# Patient Record
Sex: MALE | Race: WHITE | NOT HISPANIC OR LATINO | Employment: OTHER | URBAN - METROPOLITAN AREA
[De-identification: names, ages, dates, MRNs, and addresses within clinical notes are randomized per-mention and may not be internally consistent; named-entity substitution may affect disease eponyms.]

---

## 2023-06-01 ENCOUNTER — APPOINTMENT (EMERGENCY)
Dept: CT IMAGING | Facility: HOSPITAL | Age: 88
DRG: 375 | End: 2023-06-01
Payer: MEDICARE

## 2023-06-01 ENCOUNTER — APPOINTMENT (INPATIENT)
Dept: VASCULAR ULTRASOUND | Facility: HOSPITAL | Age: 88
DRG: 375 | End: 2023-06-01
Payer: MEDICARE

## 2023-06-01 ENCOUNTER — HOSPITAL ENCOUNTER (INPATIENT)
Facility: HOSPITAL | Age: 88
LOS: 1 days | DRG: 375 | End: 2023-06-02
Attending: EMERGENCY MEDICINE | Admitting: FAMILY MEDICINE
Payer: MEDICARE

## 2023-06-01 DIAGNOSIS — R77.8 ELEVATED TROPONIN: ICD-10-CM

## 2023-06-01 DIAGNOSIS — I48.91 ATRIAL FIBRILLATION, UNSPECIFIED TYPE (HCC): ICD-10-CM

## 2023-06-01 DIAGNOSIS — I25.10 CORONARY ARTERY DISEASE INVOLVING NATIVE HEART, UNSPECIFIED VESSEL OR LESION TYPE, UNSPECIFIED WHETHER ANGINA PRESENT: ICD-10-CM

## 2023-06-01 DIAGNOSIS — C78.7 METASTATIC COLON CANCER TO LIVER (HCC): ICD-10-CM

## 2023-06-01 DIAGNOSIS — R10.9 ABDOMINAL PAIN: Primary | ICD-10-CM

## 2023-06-01 DIAGNOSIS — Z95.810 AICD (AUTOMATIC CARDIOVERTER/DEFIBRILLATOR) PRESENT: ICD-10-CM

## 2023-06-01 DIAGNOSIS — E11.22 TYPE 2 DIABETES MELLITUS WITH DIABETIC CHRONIC KIDNEY DISEASE, UNSPECIFIED CKD STAGE, UNSPECIFIED WHETHER LONG TERM INSULIN USE (HCC): ICD-10-CM

## 2023-06-01 DIAGNOSIS — K56.609 SMALL BOWEL OBSTRUCTION (HCC): ICD-10-CM

## 2023-06-01 DIAGNOSIS — K56.609 SBO (SMALL BOWEL OBSTRUCTION) (HCC): ICD-10-CM

## 2023-06-01 DIAGNOSIS — C18.9 METASTATIC COLON CANCER TO LIVER (HCC): ICD-10-CM

## 2023-06-01 PROBLEM — K92.2 GI BLEED: Status: ACTIVE | Noted: 2023-06-01

## 2023-06-01 LAB
2HR DELTA HS TROPONIN: 14 NG/L
2HR DELTA HS TROPONIN: 6 NG/L
4HR DELTA HS TROPONIN: 10 NG/L
4HR DELTA HS TROPONIN: 6 NG/L
ABO GROUP BLD: NORMAL
ABO GROUP BLD: NORMAL
ALBUMIN SERPL BCP-MCNC: 2.9 G/DL (ref 3.5–5)
ALP SERPL-CCNC: 330 U/L (ref 34–104)
ALT SERPL W P-5'-P-CCNC: 10 U/L (ref 7–52)
ANION GAP SERPL CALCULATED.3IONS-SCNC: 9 MMOL/L (ref 4–13)
ANISOCYTOSIS BLD QL SMEAR: PRESENT
AST SERPL W P-5'-P-CCNC: 16 U/L (ref 13–39)
ATRIAL RATE: 107 BPM
ATRIAL RATE: 108 BPM
BASOPHILS # BLD AUTO: 0.04 THOUSANDS/ÂΜL (ref 0–0.1)
BASOPHILS # BLD MANUAL: 0 THOUSAND/UL (ref 0–0.1)
BASOPHILS NFR BLD AUTO: 0 % (ref 0–1)
BASOPHILS NFR MAR MANUAL: 0 % (ref 0–1)
BILIRUB SERPL-MCNC: 0.62 MG/DL (ref 0.2–1)
BLD GP AB SCN SERPL QL: NEGATIVE
BUN SERPL-MCNC: 20 MG/DL (ref 5–25)
BURR CELLS BLD QL SMEAR: PRESENT
CALCIUM ALBUM COR SERPL-MCNC: 9.7 MG/DL (ref 8.3–10.1)
CALCIUM SERPL-MCNC: 8.8 MG/DL (ref 8.4–10.2)
CARDIAC TROPONIN I PNL SERPL HS: 101 NG/L
CARDIAC TROPONIN I PNL SERPL HS: 60 NG/L
CARDIAC TROPONIN I PNL SERPL HS: 66 NG/L
CARDIAC TROPONIN I PNL SERPL HS: 70 NG/L
CARDIAC TROPONIN I PNL SERPL HS: 87 NG/L
CARDIAC TROPONIN I PNL SERPL HS: 93 NG/L
CHLORIDE SERPL-SCNC: 103 MMOL/L (ref 96–108)
CO2 SERPL-SCNC: 26 MMOL/L (ref 21–32)
CREAT SERPL-MCNC: 1.27 MG/DL (ref 0.6–1.3)
DACRYOCYTES BLD QL SMEAR: PRESENT
EOSINOPHIL # BLD AUTO: 0 THOUSAND/ÂΜL (ref 0–0.61)
EOSINOPHIL # BLD MANUAL: 0 THOUSAND/UL (ref 0–0.4)
EOSINOPHIL NFR BLD AUTO: 0 % (ref 0–6)
EOSINOPHIL NFR BLD MANUAL: 0 % (ref 0–6)
ERYTHROCYTE [DISTWIDTH] IN BLOOD BY AUTOMATED COUNT: 19.4 % (ref 11.6–15.1)
ERYTHROCYTE [DISTWIDTH] IN BLOOD BY AUTOMATED COUNT: 19.4 % (ref 11.6–15.1)
FERRITIN SERPL-MCNC: 140 NG/ML (ref 24–336)
GFR SERPL CREATININE-BSD FRML MDRD: 50 ML/MIN/1.73SQ M
GLUCOSE SERPL-MCNC: 132 MG/DL (ref 65–140)
GLUCOSE SERPL-MCNC: 136 MG/DL (ref 65–140)
HCT VFR BLD AUTO: 26.4 % (ref 36.5–49.3)
HCT VFR BLD AUTO: 31.4 % (ref 36.5–49.3)
HCT VFR BLD AUTO: 31.9 % (ref 36.5–49.3)
HCT VFR BLD AUTO: 34.6 % (ref 36.5–49.3)
HGB BLD-MCNC: 10.1 G/DL (ref 12–17)
HGB BLD-MCNC: 10.9 G/DL (ref 12–17)
HGB BLD-MCNC: 8.3 G/DL (ref 12–17)
HGB BLD-MCNC: 9.8 G/DL (ref 12–17)
IMM GRANULOCYTES # BLD AUTO: 0.11 THOUSAND/UL (ref 0–0.2)
IMM GRANULOCYTES NFR BLD AUTO: 1 % (ref 0–2)
IRON SATN MFR SERPL: 8 % (ref 20–50)
IRON SERPL-MCNC: 23 UG/DL (ref 65–175)
LACTATE SERPL-SCNC: 1.3 MMOL/L (ref 0.5–2)
LYMPHOCYTES # BLD AUTO: 0.92 THOUSANDS/ÂΜL (ref 0.6–4.47)
LYMPHOCYTES # BLD AUTO: 2.19 THOUSAND/UL (ref 0.6–4.47)
LYMPHOCYTES # BLD AUTO: 22 % (ref 14–44)
LYMPHOCYTES NFR BLD AUTO: 8 % (ref 14–44)
MCH RBC QN AUTO: 28.2 PG (ref 26.8–34.3)
MCH RBC QN AUTO: 28.2 PG (ref 26.8–34.3)
MCHC RBC AUTO-ENTMCNC: 31.5 G/DL (ref 31.4–37.4)
MCHC RBC AUTO-ENTMCNC: 31.7 G/DL (ref 31.4–37.4)
MCV RBC AUTO: 89 FL (ref 82–98)
MCV RBC AUTO: 89 FL (ref 82–98)
MONOCYTES # BLD AUTO: 1.39 THOUSAND/UL (ref 0–1.22)
MONOCYTES # BLD AUTO: 1.87 THOUSAND/ÂΜL (ref 0.17–1.22)
MONOCYTES NFR BLD AUTO: 17 % (ref 4–12)
MONOCYTES NFR BLD: 14 % (ref 4–12)
NEUTROPHILS # BLD AUTO: 8.41 THOUSANDS/ÂΜL (ref 1.85–7.62)
NEUTROPHILS # BLD MANUAL: 6.26 THOUSAND/UL (ref 1.85–7.62)
NEUTS BAND NFR BLD MANUAL: 3 % (ref 0–8)
NEUTS SEG NFR BLD AUTO: 60 % (ref 43–75)
NEUTS SEG NFR BLD AUTO: 74 % (ref 43–75)
NRBC BLD AUTO-RTO: 0 /100 WBCS
OVALOCYTES BLD QL SMEAR: PRESENT
P AXIS: 1 DEGREES
P AXIS: 28 DEGREES
PLATELET # BLD AUTO: 334 THOUSANDS/UL (ref 149–390)
PLATELET # BLD AUTO: 355 THOUSANDS/UL (ref 149–390)
PLATELET BLD QL SMEAR: ADEQUATE
PMV BLD AUTO: 9.2 FL (ref 8.9–12.7)
PMV BLD AUTO: 9.4 FL (ref 8.9–12.7)
POLYCHROMASIA BLD QL SMEAR: PRESENT
POTASSIUM SERPL-SCNC: 3.7 MMOL/L (ref 3.5–5.3)
PR INTERVAL: 250 MS
PR INTERVAL: 256 MS
PROT SERPL-MCNC: 6.1 G/DL (ref 6.4–8.4)
QRS AXIS: -10 DEGREES
QRS AXIS: -10 DEGREES
QRSD INTERVAL: 106 MS
QRSD INTERVAL: 108 MS
QT INTERVAL: 296 MS
QT INTERVAL: 300 MS
QTC INTERVAL: 396 MS
QTC INTERVAL: 400 MS
RBC # BLD AUTO: 3.58 MILLION/UL (ref 3.88–5.62)
RBC # BLD AUTO: 3.87 MILLION/UL (ref 3.88–5.62)
RBC MORPH BLD: PRESENT
RH BLD: POSITIVE
RH BLD: POSITIVE
SODIUM SERPL-SCNC: 138 MMOL/L (ref 135–147)
SPECIMEN EXPIRATION DATE: NORMAL
T WAVE AXIS: 169 DEGREES
T WAVE AXIS: 177 DEGREES
TIBC SERPL-MCNC: 290 UG/DL (ref 250–450)
VARIANT LYMPHS # BLD AUTO: 1 %
VENTRICULAR RATE: 107 BPM
VENTRICULAR RATE: 108 BPM
WBC # BLD AUTO: 11.35 THOUSAND/UL (ref 4.31–10.16)
WBC # BLD AUTO: 9.94 THOUSAND/UL (ref 4.31–10.16)

## 2023-06-01 PROCEDURE — 87040 BLOOD CULTURE FOR BACTERIA: CPT | Performed by: EMERGENCY MEDICINE

## 2023-06-01 PROCEDURE — 74177 CT ABD & PELVIS W/CONTRAST: CPT

## 2023-06-01 PROCEDURE — 85025 COMPLETE CBC W/AUTO DIFF WBC: CPT | Performed by: EMERGENCY MEDICINE

## 2023-06-01 PROCEDURE — C9113 INJ PANTOPRAZOLE SODIUM, VIA: HCPCS | Performed by: FAMILY MEDICINE

## 2023-06-01 PROCEDURE — 83550 IRON BINDING TEST: CPT | Performed by: FAMILY MEDICINE

## 2023-06-01 PROCEDURE — 83605 ASSAY OF LACTIC ACID: CPT | Performed by: EMERGENCY MEDICINE

## 2023-06-01 PROCEDURE — 83540 ASSAY OF IRON: CPT | Performed by: FAMILY MEDICINE

## 2023-06-01 PROCEDURE — 93971 EXTREMITY STUDY: CPT | Performed by: SURGERY

## 2023-06-01 PROCEDURE — 84484 ASSAY OF TROPONIN QUANT: CPT | Performed by: FAMILY MEDICINE

## 2023-06-01 PROCEDURE — 80053 COMPREHEN METABOLIC PANEL: CPT | Performed by: EMERGENCY MEDICINE

## 2023-06-01 PROCEDURE — 99284 EMERGENCY DEPT VISIT MOD MDM: CPT

## 2023-06-01 PROCEDURE — 96361 HYDRATE IV INFUSION ADD-ON: CPT

## 2023-06-01 PROCEDURE — 85007 BL SMEAR W/DIFF WBC COUNT: CPT | Performed by: FAMILY MEDICINE

## 2023-06-01 PROCEDURE — 84484 ASSAY OF TROPONIN QUANT: CPT | Performed by: EMERGENCY MEDICINE

## 2023-06-01 PROCEDURE — 99223 1ST HOSP IP/OBS HIGH 75: CPT | Performed by: NURSE PRACTITIONER

## 2023-06-01 PROCEDURE — 96375 TX/PRO/DX INJ NEW DRUG ADDON: CPT

## 2023-06-01 PROCEDURE — 99223 1ST HOSP IP/OBS HIGH 75: CPT | Performed by: PHYSICIAN ASSISTANT

## 2023-06-01 PROCEDURE — 36415 COLL VENOUS BLD VENIPUNCTURE: CPT | Performed by: EMERGENCY MEDICINE

## 2023-06-01 PROCEDURE — 99223 1ST HOSP IP/OBS HIGH 75: CPT | Performed by: INTERNAL MEDICINE

## 2023-06-01 PROCEDURE — 93010 ELECTROCARDIOGRAM REPORT: CPT | Performed by: INTERNAL MEDICINE

## 2023-06-01 PROCEDURE — 86901 BLOOD TYPING SEROLOGIC RH(D): CPT | Performed by: EMERGENCY MEDICINE

## 2023-06-01 PROCEDURE — 85014 HEMATOCRIT: CPT | Performed by: FAMILY MEDICINE

## 2023-06-01 PROCEDURE — 82948 REAGENT STRIP/BLOOD GLUCOSE: CPT

## 2023-06-01 PROCEDURE — 85018 HEMOGLOBIN: CPT | Performed by: FAMILY MEDICINE

## 2023-06-01 PROCEDURE — 85027 COMPLETE CBC AUTOMATED: CPT | Performed by: FAMILY MEDICINE

## 2023-06-01 PROCEDURE — 93005 ELECTROCARDIOGRAM TRACING: CPT

## 2023-06-01 PROCEDURE — 96374 THER/PROPH/DIAG INJ IV PUSH: CPT

## 2023-06-01 PROCEDURE — 99223 1ST HOSP IP/OBS HIGH 75: CPT | Performed by: FAMILY MEDICINE

## 2023-06-01 PROCEDURE — 82728 ASSAY OF FERRITIN: CPT | Performed by: FAMILY MEDICINE

## 2023-06-01 PROCEDURE — 93971 EXTREMITY STUDY: CPT

## 2023-06-01 PROCEDURE — 86850 RBC ANTIBODY SCREEN: CPT | Performed by: EMERGENCY MEDICINE

## 2023-06-01 PROCEDURE — 86900 BLOOD TYPING SEROLOGIC ABO: CPT | Performed by: EMERGENCY MEDICINE

## 2023-06-01 RX ORDER — TRAZODONE HYDROCHLORIDE 50 MG/1
50 TABLET ORAL
COMMUNITY
End: 2023-06-02

## 2023-06-01 RX ORDER — DEXTROSE AND SODIUM CHLORIDE 5; .9 G/100ML; G/100ML
100 INJECTION, SOLUTION INTRAVENOUS CONTINUOUS
Status: DISCONTINUED | OUTPATIENT
Start: 2023-06-01 | End: 2023-06-02 | Stop reason: HOSPADM

## 2023-06-01 RX ORDER — DIGOXIN 125 MCG
125 TABLET ORAL DAILY
COMMUNITY
End: 2023-06-02

## 2023-06-01 RX ORDER — FINASTERIDE 5 MG/1
5 TABLET, FILM COATED ORAL DAILY
COMMUNITY
End: 2023-06-02

## 2023-06-01 RX ORDER — FERROUS SULFATE 325(65) MG
325 TABLET ORAL
COMMUNITY
End: 2023-06-02

## 2023-06-01 RX ORDER — ONDANSETRON 2 MG/ML
4 INJECTION INTRAMUSCULAR; INTRAVENOUS EVERY 6 HOURS PRN
Status: DISCONTINUED | OUTPATIENT
Start: 2023-06-01 | End: 2023-06-02 | Stop reason: HOSPADM

## 2023-06-01 RX ORDER — POLYETHYLENE GLYCOL 3350 17 G/17G
17 POWDER, FOR SOLUTION ORAL DAILY
COMMUNITY
End: 2023-06-02

## 2023-06-01 RX ORDER — ROSUVASTATIN CALCIUM 20 MG/1
20 TABLET, COATED ORAL DAILY
COMMUNITY
End: 2023-06-02

## 2023-06-01 RX ORDER — CLOPIDOGREL BISULFATE 75 MG/1
75 TABLET ORAL DAILY
COMMUNITY
End: 2023-06-02

## 2023-06-01 RX ORDER — PANTOPRAZOLE SODIUM 40 MG/10ML
40 INJECTION, POWDER, LYOPHILIZED, FOR SOLUTION INTRAVENOUS EVERY 12 HOURS SCHEDULED
Status: DISCONTINUED | OUTPATIENT
Start: 2023-06-01 | End: 2023-06-02 | Stop reason: HOSPADM

## 2023-06-01 RX ORDER — ONDANSETRON 2 MG/ML
4 INJECTION INTRAMUSCULAR; INTRAVENOUS ONCE
Status: COMPLETED | OUTPATIENT
Start: 2023-06-01 | End: 2023-06-01

## 2023-06-01 RX ORDER — METOCLOPRAMIDE HYDROCHLORIDE 5 MG/ML
10 INJECTION INTRAMUSCULAR; INTRAVENOUS ONCE
Status: COMPLETED | OUTPATIENT
Start: 2023-06-01 | End: 2023-06-01

## 2023-06-01 RX ORDER — ASPIRIN 81 MG/1
81 TABLET, CHEWABLE ORAL DAILY
COMMUNITY
End: 2023-06-02

## 2023-06-01 RX ORDER — RANOLAZINE 500 MG/1
500 TABLET, EXTENDED RELEASE ORAL 2 TIMES DAILY
COMMUNITY
End: 2023-06-02

## 2023-06-01 RX ORDER — MULTIVIT-MIN/IRON FUM/FOLIC AC 7.5 MG-4
1 TABLET ORAL DAILY
COMMUNITY
End: 2023-06-02

## 2023-06-01 RX ADMIN — SODIUM CHLORIDE 250 ML: 0.9 INJECTION, SOLUTION INTRAVENOUS at 17:53

## 2023-06-01 RX ADMIN — DEXTROSE AND SODIUM CHLORIDE 100 ML/HR: 5; .9 INJECTION, SOLUTION INTRAVENOUS at 17:53

## 2023-06-01 RX ADMIN — PANTOPRAZOLE SODIUM 40 MG: 40 INJECTION, POWDER, FOR SOLUTION INTRAVENOUS at 20:27

## 2023-06-01 RX ADMIN — DEXTROSE AND SODIUM CHLORIDE 50 ML/HR: 5; .9 INJECTION, SOLUTION INTRAVENOUS at 07:35

## 2023-06-01 RX ADMIN — SODIUM CHLORIDE 1000 ML: 0.9 INJECTION, SOLUTION INTRAVENOUS at 09:30

## 2023-06-01 RX ADMIN — SODIUM CHLORIDE 1000 ML: 0.9 INJECTION, SOLUTION INTRAVENOUS at 14:13

## 2023-06-01 RX ADMIN — IOHEXOL 100 ML: 350 INJECTION, SOLUTION INTRAVENOUS at 05:45

## 2023-06-01 RX ADMIN — METOCLOPRAMIDE HYDROCHLORIDE 10 MG: 5 INJECTION INTRAMUSCULAR; INTRAVENOUS at 05:28

## 2023-06-01 RX ADMIN — PANTOPRAZOLE SODIUM 40 MG: 40 INJECTION, POWDER, FOR SOLUTION INTRAVENOUS at 09:30

## 2023-06-01 RX ADMIN — ONDANSETRON 4 MG: 2 INJECTION INTRAMUSCULAR; INTRAVENOUS at 04:37

## 2023-06-01 RX ADMIN — SODIUM CHLORIDE 1000 ML: 0.9 INJECTION, SOLUTION INTRAVENOUS at 04:36

## 2023-06-01 NOTE — ASSESSMENT & PLAN NOTE
+ From nursing facility with symptoms of hematemesis  Does report intermittent abdominal pain  Last bowel movement 3 days ago  Denies any melanotic stools/bright red blood mixed with stools  · Hemoglobin 10 9  · Patient hypotensive with blood pressure readings originally in systolic 15A and heart rate 120s improving with IV fluids  · Monitor H&H every 8 hours, transfuse if hemoglobin less than 7  · Receiving IV fluid bolus  On maintenance fluids  · On IV Protonix  · N p o   · Likely in the setting of proximal colon mass

## 2023-06-01 NOTE — ASSESSMENT & PLAN NOTE
· CT abdomen pelvis noting proximal colon mass causing small bowel obstruction  Also noted extensive bilateral hepatic metastasis  · Currently n p o /IV fluids/as needed morphine/Zofran  · General surgery consulted  · Palliative care consulted to address goals of care given metastatic colon cancer

## 2023-06-01 NOTE — QUICK NOTE
"Lengthy discussion with Mr Tor Mansfield at bedside  He is awake and alert at the time of conversation, although hard of hearing  He tells me that he knows he has cancer and that it has spread throughout his body  He confirms that he declined chemo because, although he knew it may buy him some time, he asks \"at what cost?\"  He tells me he had decided years ago that he would never do chemotherapy  He also is able to explain that he understands he has a mass obstructing his colon and that he would be at very high risk for surgery  He acknowledges that his cardiologist in [place] had already told him that his heart would make it high risk for him to survive any type of surgery  Mr Tor Mansfield is further able to tell me he understands that his issues are not able to be fixed and that he knows he is at the end of his life  He does tell me he'd like to live and that he thought he would have more time, but that his body is telling him his time is up  He states his ultimate goal would be to get to [place] to spend the time he has left with his daughter and family  He stated his clear intention that he wished to \"die with dignity\"  We discussed option of hospice and option to use medications/therapies to avoid pain and suffering, to which he was agreeable  Discussed with Dr Ankur Cardenas and Dr Damian Headings     "

## 2023-06-01 NOTE — ASSESSMENT & PLAN NOTE
· Known history of colon cancer [attempting to get records from patient's prior oncology team]  · Patient clearly states he does not want any chemotherapy  · Undecided regarding any surgery  · Palliative care consulted to address goals of care  · Oncology consulted

## 2023-06-01 NOTE — PLAN OF CARE
Problem: CARDIOVASCULAR - ADULT  Goal: Maintains optimal cardiac output and hemodynamic stability  Description: INTERVENTIONS:  - Monitor I/O, vital signs and rhythm  - Monitor for S/S and trends of decreased cardiac output  - Administer and titrate ordered vasoactive medications to optimize hemodynamic stability  - Assess quality of pulses, skin color and temperature  - Assess for signs of decreased coronary artery perfusion  - Instruct patient to report change in severity of symptoms  Outcome: Progressing  Goal: Absence of cardiac dysrhythmias or at baseline rhythm  Description: INTERVENTIONS:  - Continuous cardiac monitoring, vital signs, obtain 12 lead EKG if ordered  - Administer antiarrhythmic and heart rate control medications as ordered  - Monitor electrolytes and administer replacement therapy as ordered  Outcome: Progressing     Problem: GASTROINTESTINAL - ADULT  Goal: Minimal or absence of nausea and/or vomiting  Description: INTERVENTIONS:  - Administer IV fluids if ordered to ensure adequate hydration  - Maintain NPO status until nausea and vomiting are resolved  - Nasogastric tube if ordered  - Administer ordered antiemetic medications as needed  - Provide nonpharmacologic comfort measures as appropriate  - Advance diet as tolerated, if ordered  - Consider nutrition services referral to assist patient with adequate nutrition and appropriate food choices  Outcome: Progressing  Goal: Maintains or returns to baseline bowel function  Description: INTERVENTIONS:  - Assess bowel function  - Encourage oral fluids to ensure adequate hydration  - Administer IV fluids if ordered to ensure adequate hydration  - Administer ordered medications as needed  - Encourage mobilization and activity  - Consider nutritional services referral to assist patient with adequate nutrition and appropriate food choices  Outcome: Progressing  Goal: Maintains adequate nutritional intake  Description: INTERVENTIONS:  - Monitor percentage of each meal consumed  - Identify factors contributing to decreased intake, treat as appropriate  - Assist with meals as needed  - Monitor I&O, weight, and lab values if indicated  - Obtain nutrition services referral as needed  Outcome: Progressing  Goal: Establish and maintain optimal ostomy function  Description: INTERVENTIONS:  - Assess bowel function  - Encourage oral fluids to ensure adequate hydration  - Administer IV fluids if ordered to ensure adequate hydration   - Administer ordered medications as needed  - Encourage mobilization and activity  - Nutrition services referral to assist patient with appropriate food choices  - Assess stoma site  - Consider wound care consult   Outcome: Progressing  Goal: Oral mucous membranes remain intact  Description: INTERVENTIONS  - Assess oral mucosa and hygiene practices  - Implement preventative oral hygiene regimen  - Implement oral medicated treatments as ordered  - Initiate Nutrition services referral as needed  Outcome: Progressing

## 2023-06-01 NOTE — CONSULTS
Consultation - General Surgery  Vishal Piña 82 y o  male MRN: [id number]  Unit/Bed#: ED 12 Encounter: [id number]                                                  Inpatient consult to Acute Care Surgery  Consult performed by: Wilber Doyle PA-C  Consult ordered by: Chanelle Zacarias PA-C          Assessment/Plan   18G M with Small Bowel Obstruction 2/2 proximal colon mass  Metastatic Colon CA with metastasis to the liver  Mild B/L pleural effusions  Diabetes Mellitus  HTN  CAD on Plavix/ASA 81, s/p CABG  - unclear when last dose  - most recent ECHO 5/22/23 with LVEF 55-60%  Atrial Fibrillation with AICD in place  - on digoxin  - reports Watchman device  HLD  BPH  H/o C  Diff infection  Anemia  Indwelling bhakta catheter for BPH with urinary retention    Presented with abdominal pain, nausea and vomiting  No BM/Flatus for 3 days  Clinical and imaging findings consistent with SBO 2/2 colon mass with incompetent cecal valve  Plan  -- Patient is not an optimal surgical candidate with metastatic disease and poor nutritional status along with multiple comorbidities and advanced age  Since Mr Oksana Vazquez is interested in the potential to pursue surgical options, it would depend on if he is deemed an appropriate risk by Cardiology  The proposed surgery would be a diverting loop ileostomy, attempted laparoscopically  It was explained to patient that he is high risk for surgery as well as that this is a palliative surgery and is not curative as we would not be removing any tumor and he has metastatic disease  -- NG tube for decompression  Patient initially refused, but now agreeable  -- Diet NPO  -- IV fluids  -- Cardiology consultation for risk stratification  -- Venous US of LUE to rule out DVT  -- Monitor labs and abdominal exams  -- DVT ppx recommended  -- Monitor I/Os  -- Patient and plan discussed with Surgical attending   Further decision making per attending   ______________________________________________________________________    CHIEF COMPLAINT:  Abdominal pain, coffee ground emesis, diarrhea, known colon CA    HPI: Keli Belle is a 82y o  year old male with PMHx of atrial fibrillation, CAD s/p CABG, Diabetes Mellitus, HTNand known colon mass who presented from [facility] by EMS for R sided abdominal pain, and coffee ground emesis  He reports vomiting 3 times a dark color  He did not feel well yesterday and has decreased appetite and nausea  He has had less of an appetite over the past 1-2 weeks  He was recommended nutritional supplements but has not been able to tolerated them  He has lost weight which he states was initially intentional to help with his diabetes but more recently has has not had much of an appetite  He has also felt increasingly weak  He has not passed flatus or had a BM in 3 days  Before that he had intermittent loose stool and was being treated for C  Diff  He does admit to occasional dark black stool  He is aware of his colon mass and liver mets and states he was diagnosed in [place] a little over a month ago  He had been having intermittent obstructions and was anemic  Colonoscopy confirmed a colon CA  He does not want chemotherapy or other treatment but is open to potential surgical intervention depending on quality of life  He does note a recent fall for which he was on the floor for almost 2 days  He was hospitalized at [facility] and found to have a rib fx 2 weeks ago  He was discharged to rehab and readmitted with atrial flutter with RVR  He also has an inpatient stay in [facility] at the beginning of May and required 2U PRBC for anemia  He has surgical history of Lap argelia and CABG  Review of Systems   Constitutional: Positive for activity change, appetite change, fatigue and unexpected weight change  Negative for chills and fever  HENT: Negative for congestion, postnasal drip and sore throat  Eyes: Negative  Respiratory: Negative  Negative for cough, shortness of breath and wheezing  Cardiovascular: Negative for chest pain and palpitations  Gastrointestinal: Positive for abdominal distention, abdominal pain, constipation, nausea and vomiting  Negative for diarrhea  Endocrine: Negative  Genitourinary: Leroy   Musculoskeletal: Negative  Skin: Positive for pallor  Allergic/Immunologic: Negative  Neurological: Positive for weakness  Hematological: Negative  Psychiatric/Behavioral: Negative  All other systems reviewed and are negative  Meds/Allergies   Allergies   Allergen Reactions   • Ambien [Zolpidem] Delirium   • Aspirin Other (See Comments)     Unknown    • Diphenhydramine Hyperactivity   • Metformin Other (See Comments)     unknown      all current active meds have been reviewed       Historical Information   Past Medical History:   Diagnosis Date   • Anemia    • Atrial fibrillation (HCC)    • BPH (benign prostatic hyperplasia)    • C  difficile diarrhea    • Cystitis    • [facility]    • Obstructive uropathy      History reviewed  No pertinent surgical history  Social History   Social History     Substance and Sexual Activity   Alcohol Use Never     Social History     Substance and Sexual Activity   Drug Use Never     Social History     Tobacco Use   Smoking Status Unknown   Smokeless Tobacco Not on file       Family History: History reviewed  No pertinent family history        Objective   Lab Results:   Lab Results   Component Value Date    HCT 31 4 (L) 06/01/2023    HGB 9 8 (L) 06/01/2023     06/01/2023    WBC 9 94 06/01/2023     Lab Results   Component Value Date    BUN 20 06/01/2023     06/01/2023    CO2 26 06/01/2023    CREATININE 1 27 06/01/2023    K 3 7 06/01/2023     Lab Results   Component Value Date    CALCIUM 8 8 06/01/2023     Lab Results   Component Value Date    ALB 2 9 (L) 06/01/2023    ALKPHOS 330 (H) 06/01/2023    ALT 10 "06/01/2023    AST 16 06/01/2023    TBILI 0 62 06/01/2023     No results found for: \"APTT\", \"INR\"  No results found for: \"BNP\", \"TROPONINI\", \"TROPONINT\"    No intake or output data in the 24 hours ending 06/01/23 0839  Invasive Devices     Peripheral Intravenous Line  Duration           Peripheral IV 06/01/23 Left;Ventral (anterior) Hand <1 day    Peripheral IV 06/01/23 Right Antecubital <1 day                Physical Exam  Vitals: BP 93/54 (BP Location: Right arm)   Pulse (!) 113   Temp 98 7 °F (37 1 °C) (Oral)   Resp (!) 26   Wt 75 5 kg (166 lb 7 2 oz)   SpO2 95%   GEN: A & O x 3, cooperative, NAD, appears stated age  HEENT: PERRLA EOMI, sclera anicterus, oral mucosa pink, membranes dry  NECK: supple   LUNGS: clear throughout, decreased at base  COR: tachycardic rate  ABD: +BS, abdomen distended and tympanic with epigastric tenderness to palpation, non peritoneal, non rigid  EXTREM: FROM no joint deformities  Edema none BLE, +LUE edema wo tenderness   SKIN: warm, dry, no rash, there is pallor  NEURO: CN II -XII intact, no tremor, affect appropriate    Imaging Studies:   CT abdomen pelvis with contrast  Result Date: 6/1/2023  Impression: 1  Proximal colonic mass causing a small bowel obstruction  2   Extensive bilateral hepatic metastases  3   Small bilateral pleural effusions with bibasilar passive atelectasis   I personally discussed this study with [facility] on 6/1/2023 6:15 AM  Workstation performed: [id number]         [address] Mate[address] Marte PA-C  6/1/2023  "

## 2023-06-01 NOTE — SOCIAL WORK
"  Palliative LSW saw patient at the bedside today  LSW appreciates the opportunity to provide patient/family with inpatient emotional support and guidance while patient continues to receive medical attention from the medical team     Topics discussed: Patient reported that he met with various teams today and surgery doesn't seem to be an option for him  Patient reported that he does understand that without the NGT, he runs the risk of his \"intestines exploding and then I'm gone\"  Patient reported that he can't go back to the rehab due to it being too risky  Patient reported that he would like to go to [place] to be with his daughter  Patient reported that with his cancer dx, his prognosis is less than 1 yr  [facility] team discussed risks of complications with the SBO and how that could alter prognosis  Discussion regarding optimizing as best as can be done if his goal is to go to [place] and to attempt to reduce risk during travel  Patient aware that he could return to rehab on hospice however stated he can't afford the $14,000 a month bill  Areas that need follow-up: ongoing support   Resources given: none  Others present:  [facility]    I have spent 40 minutes with Patient  today in which greater than 50% of this time was spent in counseling/coordination of care regarding Patient and family education, Counseling / Coordination of care, Documenting in the medical record, Obtaining or reviewing history   and Communicating with other healthcare professionals          LSW will continue to follow as requested by the medical team, patient, or family    "

## 2023-06-01 NOTE — H&P
[facility]  H&P  Name: Ivory Garcia 82 y o  male I MRN: [id number]  Unit/Bed#: ED 15 I Date of Admission: 6/1/2023   Date of Service: 6/1/2023 I Hospital Day: 0      Assessment/Plan   GI bleed  Assessment & Plan  + From nursing facility with symptoms of hematemesis  Does report intermittent abdominal pain  Last bowel movement 3 days ago  Denies any melanotic stools/bright red blood mixed with stools  · Hemoglobin 10 9  · Patient hypotensive with blood pressure readings originally in systolic 96E and heart rate 120s improving with IV fluids  · Monitor H&H every 8 hours, transfuse if hemoglobin less than 7  · Receiving IV fluid bolus  On maintenance fluids  · On IV Protonix  · N p o   · Likely in the setting of proximal colon mass  SBO (small bowel obstruction) (HCC)  Assessment & Plan  · CT abdomen pelvis noting proximal colon mass causing small bowel obstruction  Also noted extensive bilateral hepatic metastasis  · Currently n p o /IV fluids/as needed morphine/Zofran  · General surgery consulted  · Palliative care consulted to address goals of care given metastatic colon cancer  Elevated troponin  Assessment & Plan  · Troponin trend 60/66, continue to trend until peak  · 2D echo ordered  · Telemetry ordered  · Patient currently chest pain-free  · Holding off on aspirin or heparin given patient having hematemesis  Metastatic colon cancer to [facility])  Assessment & Plan  · Known history of colon cancer [attempting to get records from patient's prior oncology team]  · Patient clearly states he does not want any chemotherapy  · Undecided regarding any surgery  · Palliative care consulted to address goals of care  · Oncology consulted  VTE Prophylaxis: Pharmacologic VTE Prophylaxis contraindicated due to GI bleed  / sequential compression device   Code Status: level 3  POLST: POLST form is not discussed and not completed at this time    Discussion with family: adam daughter Edin Bearden    Anticipated Length of Stay:  Patient will be admitted on an Inpatient basis with an anticipated length of stay of  over 2 midnights  Justification for Hospital Stay: SBO    Total Time for Visit, including Counseling / Coordination of Care: 45 minutes  Greater than 50% of this total time spent on direct patient counseling and coordination of care  Chief Complaint:   Hematemesis    History of Present Illness:    Mildred Golden is a 82 y o  male with known underlying history of colon cancer/hepatic meta stasis who presents to the ED from SNF facility due to symptoms of hematemesis over the last 3 days  He does report intermittent abdominal pain  Last bowel movement 3 days ago  Currently not passing flatus  Denies any black tarry stools/bright red blood mixed with stools  On speaking with patient, he adamantly refuses any chemotherapy but states would like to meet with surgical team in regards to surgical options  On speaking with his daughter she does report patient is DNR/DNI and has been leaning more towards hospice/palliative options in the past     Review of Systems:    Review of Systems   Constitutional: Positive for activity change, appetite change and fatigue  Negative for chills, diaphoresis and fever  HENT: Negative for congestion, postnasal drip and rhinorrhea  Respiratory: Negative for cough, shortness of breath and wheezing  Cardiovascular: Negative for chest pain, palpitations and leg swelling  Gastrointestinal: Positive for abdominal pain, constipation, nausea and vomiting  Negative for blood in stool and diarrhea  Genitourinary: Negative for dysuria, flank pain, frequency and hematuria  Musculoskeletal: Negative for gait problem and myalgias  Skin: Negative for rash  Neurological: Positive for weakness  Negative for dizziness, seizures, speech difficulty, light-headedness and headaches         Past Medical and Surgical History:     Past Medical History: Diagnosis Date   • Anemia    • Atrial fibrillation (HCC)    • BPH (benign prostatic hyperplasia)    • C  difficile diarrhea    • Cystitis    • Malignant ne[facility] )    • Obstructive uropathy        History reviewed  No pertinent surgical history  Meds/Allergies:    Prior to Admission medications    Not on File     I have reviewed home medications using allscripts  Allergies: Allergies   Allergen Reactions   • Ambien [Zolpidem] Delirium   • Aspirin Other (See Comments)     Unknown    • Diphenhydramine Hyperactivity   • Metformin Other (See Comments)     unknown       Social History:     Marital Status:      Substance Use History:   Social History     Substance and Sexual Activity   Alcohol Use Never     Social History     Tobacco Use   Smoking Status Unknown   Smokeless Tobacco Not on file     Social History     Substance and Sexual Activity   Drug Use Never       Family History:    non-contributory    Physical Exam:     Vitals:   Blood Pressure: 100/52 (06/01/23 1015)  Pulse: (!) 112 (06/01/23 1015)  Temperature: 98 7 °F (37 1 °C) (06/01/23 0416)  Temp Source: Oral (06/01/23 0416)  Respirations: (!) 26 (06/01/23 1015)  Weight - Scale: 75 5 kg (166 lb 7 2 oz) (06/01/23 0409)  SpO2: 94 % (06/01/23 1015)    Physical Exam  Vitals reviewed  Constitutional:       General: He is not in acute distress  Appearance: He is ill-appearing  HENT:      Head: Normocephalic and atraumatic  Nose: Nose normal  No congestion  Mouth/Throat:      Mouth: Mucous membranes are dry  Pharynx: Oropharynx is clear  Eyes:      Conjunctiva/sclera: Conjunctivae normal       Pupils: Pupils are equal, round, and reactive to light  Cardiovascular:      Rate and Rhythm: Regular rhythm  Tachycardia present  Pulses: Normal pulses  Pulmonary:      Effort: Pulmonary effort is normal  No respiratory distress  Breath sounds: Normal breath sounds  No wheezing or rales     Abdominal: General: There is distension  Palpations: Abdomen is soft  Tenderness: There is abdominal tenderness  There is no guarding  Musculoskeletal:         General: No swelling  Normal range of motion  Cervical back: Normal range of motion and neck supple  Skin:     General: Skin is warm and dry  Findings: No rash  Neurological:      General: No focal deficit present  Mental Status: He is alert and oriented to person, place, and time  Psychiatric:         Mood and Affect: Mood normal        Additional Data:     Lab Results: I have personally reviewed pertinent reports  Results from last 7 days   Lab Units 06/01/23  0927 06/01/23  0432   BANDS PCT % 3  --    EOS PCT % 0 0   HEMATOCRIT % 31 9* 34 6*   HEMOGLOBIN g/dL 10 1* 10 9*   LYMPHS PCT %  --  8*   LYMPHO PCT % 22  --    MONOS PCT %  --  17*   MONO PCT % 14*  --    NEUTROS PCT %  --  74   PLATELETS Thousands/uL 334 355   WBC Thousand/uL 9 94 11 35*     Results from last 7 days   Lab Units 06/01/23  0432   ANION GAP mmol/L 9   ALBUMIN g/dL 2 9*   ALK PHOS U/L 330*   ALT U/L 10   AST U/L 16   BUN mg/dL 20   CALCIUM mg/dL 8 8   CHLORIDE mmol/L 103   CO2 mmol/L 26   CREATININE mg/dL 1 27   GLUCOSE RANDOM mg/dL 132   POTASSIUM mmol/L 3 7   SODIUM mmol/L 138   TOTAL BILIRUBIN mg/dL 0 62                 Results from last 7 days   Lab Units 06/01/23  0432   LACTIC ACID mmol/L 1 3       Imaging: I have personally reviewed pertinent reports  CT abdomen pelvis with contrast   Final Result by Joshua Camacho MD (06/01 5873)      1  Proximal colonic mass causing a small bowel obstruction  2   Extensive bilateral hepatic metastases  3   Small bilateral pleural effusions with bibasilar passive atelectasis              I personally discussed this study with Bailey Camacho on 6/1/2023 6:15 AM             Workstation performed: [id number]         VAS upper limb venous duplex scan, unilateral/limited    (Results Pending) Allscripts / [facility] Records Reviewed: Yes     ** Please Note: This note has been constructed using a voice recognition system   **

## 2023-06-01 NOTE — CONSULTS
Consultation - Palliative and Supportive Care   Jodie Loser 82 y o  male [id number]    Assessment:  Goals of care counseling  SBO  Colon cancer with extensive liver mets    Plan:  1  Symptom management per SLIM  2  Goals of care- patient states he is now interested in NG tube and work up for surgery if he is eligible for surgery  2    Goals:  Level 3 code status     3  Social support:  · Supportive listening provided  · Normalized experience of patient/family  · Provided anxiety containment  · Provided anticipatory guidance      4  Follow up    • Palliative Care will continue to follow for symptom management and goals of care discussions will be ongoing  Please reach out via [employer] if questions or concerns arise  I have reviewed the patient's controlled substance dispensing history in the Prescription Drug Monitoring Program in compliance with the [facility] regulations before prescribing any controlled substances  Last refills: NA    Decisional apparatus:  Patient is competent on exam today  If competency is lost, patient's substitute decision maker would default to his daughter Dayanara Cavazos by [place] 169  Advance Directive/Living Will: No  POLST: No  POA Forms: No    We appreciate the invitation to be involved in this patient's care  We will continue to follow throughout this hospitalization  Please do not hesitate to reach our on call provider through our clinic answering service at  should you have acute symptom control concerns  MARGARET Banks, ROBBY  Palliative and Supportive Care  Clinic/Answering Service: [phone]  You can find me on [employer]!      IDENTIFICATION:  Inpatient consult to Palliative Care  Consult performed by: ROBBY Pinon  Consult ordered by: Tramaine Hess MD        Physician Requesting Consult: Tramaine Hess MD  Reason for Consult / Principal Problem: GOC counseling and SM secondary to colon cancer with liver mets      History of Present Illness:  Bernadette Whitmore is a 82 y o  male who presents with a palliative diagnosis of history of colon cancer with GI blockage, [place] [facility] [place] secondary tocoffee ground emesis and diarrhea, started 1:00 AM   Recently diagnosed with c-diff, and being treated with Flagyl  He states he was seen by surgery, and after more consideration, he is interested in NG tube and work up to see if he is eligible for surgery  He states he would eventually like to be with his daughter in [place], if he is not eligible for surgery, he does not want to go back to [facility]  Review of Systems   Constitutional: Negative for fatigue  Respiratory: Negative for shortness of breath  Cardiovascular: Negative for chest pain  Gastrointestinal: Negative for nausea and vomiting  Intermittent mild abdominal discomfort   Musculoskeletal: Negative  Past Medical History:   Diagnosis Date   • Anemia    • Atrial fibrillation (HCC)    • BPH (benign prostatic hyperplasia)    • C  difficile diarrhea    • Cystitis    • Malignant neoplasm of colon ([address] )    • Obstructive uropathy      History reviewed  No pertinent surgical history    Social History     Socioeconomic History   • Marital status:      Spouse name: Not on file   • Number of children: Not on file   • Years of education: Not on file   • Highest education level: Not on file   Occupational History   • Not on file   Tobacco Use   • Smoking status: Unknown   • Smokeless tobacco: Not on file   Substance and Sexual Activity   • Alcohol use: Never   • Drug use: Never   • Sexual activity: Never   Other Topics Concern   • Not on file   Social History Narrative   • Not on file     Social Determinants of Health     Financial Resource Strain: Not on file   Food Insecurity: Not on file   Transportation Needs: Not on file   Physical Activity: Not on file   Stress: Not on file   Social Connections: Not on file Intimate Partner Violence: Not on file   Housing Stability: Not on file     History reviewed  No pertinent family history  Medications:  current meds:   Current Facility-Administered Medications   Medication Dose Route Frequency   • dextrose 5 % and sodium chloride 0 9 % infusion  100 mL/hr Intravenous Continuous   • morphine injection 2 mg  2 mg Intravenous Q6H PRN   • ondansetron (ZOFRAN) injection 4 mg  4 mg Intravenous Q6H PRN   • pantoprazole (PROTONIX) injection 40 mg  40 mg Intravenous Q12H [facility]   • sodium chloride 0 9 % bolus 1,000 mL  1,000 mL Intravenous Once       Allergies   Allergen Reactions   • Ambien [Zolpidem] Delirium   • Aspirin Other (See Comments)     Unknown    • Diphenhydramine Hyperactivity   • Metformin Other (See Comments)     unknown       Objective:  BP (!) 87/57 (BP Location: Right arm)   Pulse (!) 122   Temp 98 7 °F (37 1 °C) (Oral)   Resp (!) 30   Wt 75 5 kg (166 lb 7 2 oz)   SpO2 91%     Physical Exam  Constitutional:       General: He is not in acute distress  Cardiovascular:      Rate and Rhythm: Tachycardia present  Pulmonary:      Effort: Pulmonary effort is normal    Skin:     General: Skin is warm and dry  Neurological:      Mental Status: He is alert and oriented to person, place, and time     Psychiatric:         Mood and Affect: Mood normal            Lab Results:   CBC:   Lab Results   Component Value Date    HCT 34 6 (L) 06/01/2023    HGB 10 9 (L) 06/01/2023    MCH 28 2 06/01/2023    MCHC 31 5 06/01/2023    MCV 89 06/01/2023    MPV 9 4 06/01/2023    NRBC 0 06/01/2023     06/01/2023    RBC 3 87 (L) 06/01/2023    RDW 19 4 (H) 06/01/2023    WBC 11 35 (H) 06/01/2023   , CMP:   Lab Results   Component Value Date    ALKPHOS 330 (H) 06/01/2023    ALT 10 06/01/2023    AST 16 06/01/2023    BUN 20 06/01/2023    CALCIUM 8 8 06/01/2023     06/01/2023    CO2 26 06/01/2023    CREATININE 1 27 06/01/2023    EGFR 50 06/01/2023    K 3 7 06/01/2023    SODIUM 138 06/01/2023     Imaging Studies: I have personally reviewed pertinent reports  GALLBLADDER: Gallbladder is surgically absent      SPLEEN:  Unremarkable      PANCREAS:  Unremarkable      ADRENAL GLANDS:  Unremarkable      KIDNEYS/URETERS: One or more simple renal cyst(s) is noted  Otherwise unremarkable kidneys  No hydronephrosis      STOMACH AND BOWEL AND ABDOMINOPELVIC CAVITY:  : Focal wall thickening of the proximal ascending colon measuring up to 3 6 cm likely reflects known tumor (series 2, image 96)  The bowel proximal to this is dilated to 6 3 cm (series 601, image 72), and the   adjacent small bowel is fluid-filled and dilated up to 3 1 cm (series 2, image 136)  In the left abdomen, the fluid-filled small bowel is dilated up to 3 5 cm (series 601, image 68)  There is free fluid in the right lower quadrant adjacent to the   dilated loops of small bowel, as well as small volume ascites in the upper quadrants  No pneumoperitoneum or lymphadenopathy  Large hiatal hernia        VESSELS: Atherosclerotic changes are present  No evidence of aneurysm      PELVIS     REPRODUCTIVE ORGANS:  Unremarkable for patient's age      URINARY BLADDER: Leroy catheter in situ      ABDOMINAL WALL/INGUINAL REGIONS: Anasarca      OSSEOUS STRUCTURES: No acute fracture or destructive osseous lesion  Spinal degenerative changes are noted      IMPRESSION:     1  Proximal colonic mass causing a small bowel obstruction  2   Extensive bilateral hepatic metastases  3   Small bilateral pleural effusions with bibasilar passive atelectasis           I personally discussed this study with Noah Dorantes on 6/1/2023 6:15 AM            Workstation performed: [id number]         Counseling / Coordination of Care  Total floor / unit time spent today 40 minutes  Greater than 50% of total time was spent with the patient and / or family counseling and / or coordination of care   A description of the counseling / coordination of care: "Reviewed chart, provided medical updates, determined goals of care, discussed palliative care and symptom management, discussed comfort care, hospice care, discussed code status, provided anticipatory guidance, determined competency and POA/HCA, determined social/family support, provided psychosocial support  Reviewed with BALA KNIGHT, and Surgery      Portions of this document may have been created using dictation software and as such some \"sound alike\" terms may have been generated by the system  Do not hesitate to contact me with any questions or clarifications      "

## 2023-06-01 NOTE — ASSESSMENT & PLAN NOTE
· Troponin trend 60/66, continue to trend until peak  · 2D echo ordered  · Telemetry ordered  · Patient currently chest pain-free  · Holding off on aspirin or heparin given patient having hematemesis

## 2023-06-01 NOTE — ED PROVIDER NOTES
History  Chief Complaint   Patient presents with   • Abdominal Pain     Pt BIBA from [facility] with c/o coffee ground emesis and diarrhea which started at 1am  Recently diagnosised with c-diff and being treated with flagyl  Hx GI blockages and colon CA  61-year-old male brought in by ambulance from [place] [id number]  Recent diagnosis with C  difficile, being treated with oral Flagyl  Patient complaining of coffee-ground emesis and diarrhea  He has abdominal distention and right-sided abdominal pain  Patient has a past medical history of colon cancer and a history of GI blockages  Concern for the same  None       Past Medical History:   Diagnosis Date   • Anemia    • Atrial fibrillation (HCC)    • BPH (benign prostatic hyperplasia)    • C  difficile diarrhea    • Cystitis    • Malignant neoplasm of colon ([facility] [address] )    • Obstructive uropathy        History reviewed  No pertinent surgical history  History reviewed  No pertinent family history  I have reviewed and agree with the history as documented  E-Cigarette/Vaping     E-Cigarette/Vaping Substances     Social History     Tobacco Use   • Smoking status: Unknown   Substance Use Topics   • Alcohol use: Never   • Drug use: Never       Review of Systems   Constitutional: Negative for chills and fever  Respiratory: Negative for chest tightness and shortness of breath  Cardiovascular: Negative for chest pain and leg swelling  Gastrointestinal: Positive for abdominal pain, blood in stool, diarrhea, nausea and vomiting  Genitourinary: Negative for dysuria and flank pain  Musculoskeletal: Negative for back pain and neck pain  Skin: Negative for wound  Neurological: Positive for light-headedness  Negative for dizziness and headaches  Physical Exam  Physical Exam  Vitals reviewed  Constitutional:       General: He is not in acute distress  Appearance: He is well-developed  He is ill-appearing  He is not diaphoretic     HENT:      Head: Normocephalic and atraumatic  Eyes:      General: No scleral icterus  Right eye: No discharge  Left eye: No discharge  Conjunctiva/sclera: Conjunctivae normal       Pupils: Pupils are equal, round, and reactive to light  Neck:      Vascular: No JVD  Cardiovascular:      Rate and Rhythm: Normal rate and regular rhythm  Heart sounds: Normal heart sounds  No murmur heard  No friction rub  No gallop  Pulmonary:      Effort: Pulmonary effort is normal  No respiratory distress  Breath sounds: Normal breath sounds  No wheezing or rales  Chest:      Chest wall: No tenderness  Abdominal:      General: Bowel sounds are increased  There is distension  Palpations: Abdomen is rigid  Tenderness: There is abdominal tenderness in the right upper quadrant and right lower quadrant  There is guarding  There is no rebound  Hernia: No hernia is present  Musculoskeletal:         General: No tenderness or deformity  Normal range of motion  Cervical back: Normal range of motion and neck supple  Skin:     General: Skin is warm and dry  Coloration: Skin is not pale  Findings: No erythema or rash  Neurological:      Mental Status: He is alert and oriented to person, place, and time  Cranial Nerves: No cranial nerve deficit     Psychiatric:         Behavior: Behavior normal          Vital Signs  ED Triage Vitals   Temperature Pulse Respirations Blood Pressure SpO2   06/01/23 0416 06/01/23 0409 06/01/23 0409 06/01/23 0409 06/01/23 0409   98 7 °F (37 1 °C) (!) 110 18 95/50 99 %      Temp Source Heart Rate Source Patient Position - Orthostatic VS BP Location FiO2 (%)   06/01/23 0416 06/01/23 0409 06/01/23 0409 06/01/23 0409 --   Oral Monitor Sitting Right arm       Pain Score       06/01/23 0409       7           Vitals:    06/01/23 0515 06/01/23 0545 06/01/23 0600 06/01/23 0630   BP: 99/51 109/55 107/55 101/51   Pulse: 100 (!) 108 (!) 108 (!) 113   Patient Position - Orthostatic VS: Sitting Sitting Sitting Sitting         Visual Acuity      ED Medications  Medications   dextrose 5 % and sodium chloride 0 9 % infusion (has no administration in time range)   morphine injection 2 mg (has no administration in time range)   ondansetron (ZOFRAN) injection 4 mg (has no administration in time range)   sodium chloride 0 9 % bolus 1,000 mL (1,000 mL Intravenous New Bag 6/1/23 0436)   ondansetron (ZOFRAN) injection 4 mg (4 mg Intravenous Given 6/1/23 0437)   metoclopramide (REGLAN) injection 10 mg (10 mg Intravenous Given 6/1/23 0528)   iohexol (OMNIPAQUE) 350 MG/ML injection (SINGLE-DOSE) 100 mL (100 mL Intravenous Given 6/1/23 0545)       Diagnostic Studies  Results Reviewed     Procedure Component Value Units Date/Time    HS Troponin I 2hr [[id number]]  (Abnormal) Collected: 06/01/23 0630    Lab Status: Final result Specimen: Blood from Arm, Right Updated: 06/01/23 0707     hs TnI 2hr 66 ng/L      Delta 2hr hsTnI 6 ng/L     HS Troponin I 4hr [[id number]]     Lab Status: No result Specimen: Blood     Lactic acid, plasma (w/reflex if result > 2 0) [[id number]]  (Normal) Collected: 06/01/23 0432    Lab Status: Final result Specimen: Blood from Arm, Right Updated: 06/01/23 0520     LACTIC ACID 1 3 mmol/L     Narrative:      Result may be elevated if tourniquet was used during collection      HS Troponin 0hr (reflex protocol) [[id number]]  (Abnormal) Collected: 06/01/23 0432    Lab Status: Final result Specimen: Blood from Arm, Right Updated: 06/01/23 0511     hs TnI 0hr 60 ng/L     Comprehensive metabolic panel [[id number]]  (Abnormal) Collected: 06/01/23 0432    Lab Status: Final result Specimen: Blood from Arm, Right Updated: 06/01/23 0502     Sodium 138 mmol/L      Potassium 3 7 mmol/L      Chloride 103 mmol/L      CO2 26 mmol/L      ANION GAP 9 mmol/L      BUN 20 mg/dL      Creatinine 1 27 mg/dL      Glucose 132 mg/dL      Calcium 8 8 mg/dL      Corrected Calcium 9 7 mg/dL      AST 16 U/L ALT 10 U/L      Alkaline Phosphatase 330 U/L      Total Protein 6 1 g/dL      Albumin 2 9 g/dL      Total Bilirubin 0 62 mg/dL      eGFR 50 ml/min/1 73sq m     Narrative:      [place] guidelines for Chronic Kidney Disease (CKD):   •  Stage 1 with normal or high GFR (GFR > 90 mL/min/1 73 square meters)  •  Stage 2 Mild CKD (GFR = 60-89 mL/min/1 73 square meters)  •  Stage 3A Moderate CKD (GFR = 45-59 mL/min/1 73 square meters)  •  Stage 3B Moderate CKD (GFR = 30-44 mL/min/1 73 square meters)  •  Stage 4 Severe CKD (GFR = 15-29 mL/min/1 73 square meters)  •  Stage 5 End Stage CKD (GFR <15 mL/min/1 73 square meters)  Note: GFR calculation is accurate only with a steady state creatinine    CBC and differential [[id number]]  (Abnormal) Collected: 06/01/23 0432    Lab Status: Final result Specimen: Blood from Arm, Right Updated: 06/01/23 0454     WBC 11 35 Thousand/uL      RBC 3 87 Million/uL      Hemoglobin 10 9 g/dL      Hematocrit 34 6 %      MCV 89 fL      MCH 28 2 pg      MCHC 31 5 g/dL      RDW 19 4 %      MPV 9 4 fL      Platelets 298 Thousands/uL      nRBC 0 /100 WBCs      Neutrophils Relative 74 %      Immat GRANS % 1 %      Lymphocytes Relative 8 %      Monocytes Relative 17 %      Eosinophils Relative 0 %      Basophils Relative 0 %      Neutrophils Absolute 8 41 Thousands/µL      Immature Grans Absolute 0 11 Thousand/uL      Lymphocytes Absolute 0 92 Thousands/µL      Monocytes Absolute 1 87 Thousand/µL      Eosinophils Absolute 0 00 Thousand/µL      Basophils Absolute 0 04 Thousands/µL     Narrative: This is an appended report  These results have been appended to a previously verified report  Blood culture [[id number]] Collected: 06/01/23 0431    Lab Status: In process Specimen: Blood from Arm, Right Updated: 06/01/23 0437    Blood culture [[id number]] Collected: 06/01/23 0431    Lab Status:  In process Specimen: Blood from Hand, Left Updated: 06/01/23 0437                 CT abdomen pelvis with contrast   Final Result by Suly Grullon MD (06/01 6989)      1  Proximal colonic mass causing a small bowel obstruction  2   Extensive bilateral hepatic metastases  3   Small bilateral pleural effusions with bibasilar passive atelectasis  I personally discussed this study with Jeronimo Enamorado on 6/1/2023 6:15 AM             Workstation performed: [id number]                    Procedures  Procedures         ED Course  ED Course as of 06/01/23 0729   Thu Jun 01, 2023   0518 hs TnI 0hr(!): 60   0518 At this point, holding off on heparin or aspirin given history of GI bleeding  Medical Decision Making  82 y o  M w abdominal pain  Concern for small bowel obstruction, diverticulitis, colon wall abscess, pancreatitis, hepatitis, other intra-abdominal pathology  Abdominal labs, CAT scan, pain / vomiting control  patient will require admission  Amount and/or Complexity of Data Reviewed  Labs: ordered  Decision-making details documented in ED Course  Radiology: ordered  Risk  Prescription drug management  Disposition  Final diagnoses:   Abdominal pain   Small bowel obstruction ([address] )     Time reflects when diagnosis was documented in both MDM as applicable and the Disposition within this note     Time User Action Codes Description Comment    6/1/2023  6:21 AM Feroz Mclaughlin Add [R10 9] Abdominal pain     6/1/2023  6:21 AM Chel Mclaughlin Add [K56 609] Small bowel obstruction [facility])       ED Disposition     ED Disposition   Admit    Condition   Stable    Date/Time   Thu Jun 1, 2023  7:22 AM    Comment   Discussed karime Bah - inpatient tele  Follow-up Information    None         Patient's Medications    No medications on file       No discharge procedures on file      PDMP Review     None          ED Provider  Electronically Signed by           Valentino Tinajero,   06/01/23 7126

## 2023-06-01 NOTE — CONSULTS
Consultation - Cardiology   Aubrie Trujillo 82 y o  male MRN: [id number]  Unit/Bed#: -01 Encounter: [id number]  06/01/23  3:55 PM    Assessment/ Plan:  1  Preoperative cardiac risk assessment in patient with small bowel obstruction secondary to proximal colon mass and patient with metastatic colon cancer with metastasis to the liver; surgical intervention would be diverting loop ileostomy  Patient is considered high risk from a cardiac standpoint to proceed given multiple medical problems advanced age  Discussed the risk and benefits with the patient  We will try to continue with medical management if possible  An echocardiogram done at [facility] EF 55 to 60%, basal inferior akinesis, moderate AR, history of MitraClip, mild to moderate MR with centrally directed jet, mild IN  No further cardiac testing advised at this time  2   Nonischemic myocardial injury, peak troponin 87; secondary to hypotension, anemia, tachycardia  Recent echocardiogram   No further cardiac testing planned at this time  3   History of CAD, CABG; currently asymptomatic  Recent echocardiogram as above  Continue all medications  Monitor telemetry closely  4   Paroxysmal atrial fibrillation, currently in sinus rhythm  Patient is tachycardic  But appears asymptomatic at this time  Continue to monitor closely  Continue with IV fluids  5   Hypotension, blood pressure currently 73/34, currently getting IV fluids  Critical care at the bedside to discuss possibility for pressor support  6   Metastatic colon cancer with liver metastasis  Management per hematology/oncology  7   Anemia, H&H 9 8/31 4  Continue to monitor closely  Patient is quite pale  Patient is stable from a cardiac standpoint  We will sign off  Call if needed        History of Present Illness   Physician Requesting Consult: Aida Pappas MD    Reason for Consult / Principal Problem: preop risk assessment    HPI: Aubrie Trujillo is a 82 y o  year old male who presents with right-sided abdominal pain, coffee-ground emesis  Patient vomited 3 times  Patient did not feel well yesterday and had decreased appetite and nausea  She has had less of an appetite for the last couple of weeks  He was recommended nutritional supplements but did not tolerate them  Patient has been feeling more weak  Patient denied any bowel movements in the last 3 days  Patient was recently treated for C  difficile  Patient states he was diagnosed with colon cancer/colon mass and liver metastasis in [place] about 1 month ago  Patient has had intermittent obstructions as well as anemia since then  Patient declined chemotherapy or other treatment but was potentially open to surgical intervention if needed  Patient was recently hospitalized after a fall with rib fractures, at that time sent home on hospice  Patient also had recent hospitalization at [facility] that required 2 units of blood transfusion  Currently patient has NG tube in place  Denies any chest pain, chest pressure, chest heaviness  Denies any shortness of breath  PMH: CAD, CABG, atrial fibrillation, diabetes, hypertension, colon cancer with liver metastasis    Consults    EKG: Sinus tachycardia, first-degree AV block, ST-T wave normalities consider lateral ischemia      Review of Systems   Constitutional: Positive for appetite change and unexpected weight change  Respiratory: Negative  Cardiovascular: Negative  Gastrointestinal: Positive for abdominal pain, constipation, nausea and vomiting         + Coffee-ground emesis   Neurological: Positive for weakness  Hematological: Negative  Psychiatric/Behavioral: Negative  All other systems reviewed and are negative        Historical Information   Past Medical History:   Diagnosis Date   • Anemia    • Atrial fibrillation (HCC)    • BPH (benign prostatic hyperplasia)    • C  difficile diarrhea    • Cystitis    • Malignant neoplasm of colon ([facility] [address] )    • Obstructive uropathy      History reviewed  No pertinent surgical history  Social History     Substance and Sexual Activity   Alcohol Use Never     Social History     Substance and Sexual Activity   Drug Use Never     Social History     Tobacco Use   Smoking Status Unknown   Smokeless Tobacco Not on file       Family History: History reviewed  No pertinent family history  Meds/Allergies   all current active meds have been reviewed and current meds:   Current Facility-Administered Medications   Medication Dose Route Frequency   • dextrose 5 % and sodium chloride 0 9 % infusion  100 mL/hr Intravenous Continuous   • morphine injection 2 mg  2 mg Intravenous Q6H PRN   • ondansetron (ZOFRAN) injection 4 mg  4 mg Intravenous Q6H PRN   • pantoprazole (PROTONIX) injection 40 mg  40 mg Intravenous Q12H [address]   • sodium chloride 0 9 % bolus 1,000 mL  1,000 mL Intravenous Once     Allergies   Allergen Reactions   • Ambien [Zolpidem] Delirium   • Aspirin Other (See Comments)     Unknown    • Diphenhydramine Hyperactivity   • Metformin Other (See Comments)     unknown       Objective   Vitals: Blood pressure (!) 73/34, pulse (!) 115, temperature 98 7 °F (37 1 °C), temperature source Oral, resp  rate 22, weight 75 5 kg (166 lb 7 2 oz), SpO2 95 %  , There is no height or weight on file to calculate BMI ,   Orthostatic Blood Pressures    Flowsheet Row Most Recent Value   Blood Pressure 73/34 filed at 06/01/2023 1515   Patient Position - Orthostatic VS Lying filed at 06/01/2023 5231          Systolic (92NSC), GIV:38 , Min:73 , BZT:482     Diastolic (43LVF), HEB:35, Min:34, Max:57        Intake/Output Summary (Last 24 hours) at 6/1/2023 1555  Last data filed at 6/1/2023 1154  Gross per 24 hour   Intake 1000 ml   Output --   Net 1000 ml       Invasive Devices     Peripheral Intravenous Line  Duration           Peripheral IV 06/01/23 Left;Ventral (anterior) Hand <1 day    Peripheral IV 06/01/23 Right Antecubital <1 day          Drain  Duration           NG/OG/Enteral Tube Nasogastric 16 Fr Right nare <1 day                    Physical Exam:  GEN: Alert and oriented in no acute distress  Ill appearing, +pallor  HEENT: Sclera anicteric, conjunctivae pink  Oropharynx clear  NECK: + NGT, Supple, no carotid bruits, no significant JVD  Trachea midline, no thyromegaly  HEART: Regular tachycardic, normal S1 and S2, no murmurs, clicks, gallops or rubs  PMI nondisplaced, no thrills  LUNGS:Decreased breath sounds bilaterally; no wheezes, rales, or rhonchi  No increased work of breathing or signs of respiratory distress  ABDOMEN: Soft, nontender, nondistended, normoactive bowel sounds  EXTREMITIES: Skin warm and well perfused, no clubbing, cyanosis, or edema  NEURO: No focal findings  Normal speech  Mood and affect normal    SKIN: Normal without suspicious lesions on exposed skin        Lab Results:     Troponins:   Results from last 7 days   Lab Units 06/01/23  1340 06/01/23  0928 06/01/23  0630 06/01/23  0432   HS TNI 0HR ng/L 87*  --   --  60*   HS TNI 2HR ng/L  --   --  66*  --    HS TNI 4HR ng/L  --  70*  --   --    HSTNI D4 ng/L  --  10  --   --        CBC with diff:   Results from last 7 days   Lab Units 06/01/23  1340 06/01/23  0927 06/01/23  0432   HEMATOCRIT % 31 4* 31 9* 34 6*   HEMOGLOBIN g/dL 9 8* 10 1* 10 9*   MCH pg  --  28 2 28 2   MCHC g/dL  --  31 7 31 5   MCV fL  --  89 89   MPV fL  --  9 2 9 4   NRBC AUTO /100 WBCs  --   --  0   PLATELETS Thousands/uL  --  334 355   RBC Million/uL  --  3 58* 3 87*   RDW %  --  19 4* 19 4*   WBC Thousand/uL  --  9 94 11 35*         CMP:   Results from last 7 days   Lab Units 06/01/23  0432   ALK PHOS U/L 330*   ALT U/L 10   AST U/L 16   BUN mg/dL 20   CALCIUM mg/dL 8 8   CHLORIDE mmol/L 103   CO2 mmol/L 26   CREATININE mg/dL 1 27   EGFR ml/min/1 73sq m 50   POTASSIUM mmol/L 3 7

## 2023-06-01 NOTE — ED NOTES
"Pt refusing NG tube at this time  Pt states, \"That tube is not an option  \" Educated pt on importance but continues to refuse  Pt requesting to wait to speak to surgery prior to deciding  Er Provider notified        Krysten Valdovinos RN  06/01/23 8468    "

## 2023-06-02 VITALS
WEIGHT: 166.45 LBS | HEART RATE: 105 BPM | TEMPERATURE: 98.9 F | BODY MASS INDEX: 24.65 KG/M2 | DIASTOLIC BLOOD PRESSURE: 49 MMHG | RESPIRATION RATE: 18 BRPM | HEIGHT: 69 IN | OXYGEN SATURATION: 98 % | SYSTOLIC BLOOD PRESSURE: 99 MMHG

## 2023-06-02 PROBLEM — Z71.89 GOALS OF CARE, COUNSELING/DISCUSSION: Status: ACTIVE | Noted: 2023-06-02

## 2023-06-02 LAB
ALBUMIN SERPL BCP-MCNC: 2.4 G/DL (ref 3.5–5)
ALP SERPL-CCNC: 288 U/L (ref 34–104)
ALT SERPL W P-5'-P-CCNC: 7 U/L (ref 7–52)
ANION GAP SERPL CALCULATED.3IONS-SCNC: 6 MMOL/L (ref 4–13)
ANISOCYTOSIS BLD QL SMEAR: PRESENT
AST SERPL W P-5'-P-CCNC: 17 U/L (ref 13–39)
BASOPHILS # BLD MANUAL: 0 THOUSAND/UL (ref 0–0.1)
BASOPHILS NFR MAR MANUAL: 0 % (ref 0–1)
BILIRUB SERPL-MCNC: 0.46 MG/DL (ref 0.2–1)
BUN SERPL-MCNC: 30 MG/DL (ref 5–25)
CALCIUM ALBUM COR SERPL-MCNC: 9.1 MG/DL (ref 8.3–10.1)
CALCIUM SERPL-MCNC: 7.8 MG/DL (ref 8.4–10.2)
CHLORIDE SERPL-SCNC: 112 MMOL/L (ref 96–108)
CO2 SERPL-SCNC: 23 MMOL/L (ref 21–32)
CREAT SERPL-MCNC: 1.45 MG/DL (ref 0.6–1.3)
EOSINOPHIL # BLD MANUAL: 0 THOUSAND/UL (ref 0–0.4)
EOSINOPHIL NFR BLD MANUAL: 0 % (ref 0–6)
ERYTHROCYTE [DISTWIDTH] IN BLOOD BY AUTOMATED COUNT: 20 % (ref 11.6–15.1)
GFR SERPL CREATININE-BSD FRML MDRD: 43 ML/MIN/1.73SQ M
GLUCOSE SERPL-MCNC: 144 MG/DL (ref 65–140)
HCT VFR BLD AUTO: 26.6 % (ref 36.5–49.3)
HGB BLD-MCNC: 8.3 G/DL (ref 12–17)
LYMPHOCYTES # BLD AUTO: 0.7 THOUSAND/UL (ref 0.6–4.47)
LYMPHOCYTES # BLD AUTO: 7 % (ref 14–44)
MCH RBC QN AUTO: 28.2 PG (ref 26.8–34.3)
MCHC RBC AUTO-ENTMCNC: 31.2 G/DL (ref 31.4–37.4)
MCV RBC AUTO: 91 FL (ref 82–98)
METAMYELOCYTES NFR BLD MANUAL: 3 % (ref 0–1)
MONOCYTES # BLD AUTO: 0.3 THOUSAND/UL (ref 0–1.22)
MONOCYTES NFR BLD: 3 % (ref 4–12)
MYELOCYTES NFR BLD MANUAL: 2 % (ref 0–1)
NEUTROPHILS # BLD MANUAL: 8.53 THOUSAND/UL (ref 1.85–7.62)
NEUTS BAND NFR BLD MANUAL: 17 % (ref 0–8)
NEUTS SEG NFR BLD AUTO: 68 % (ref 43–75)
PLATELET # BLD AUTO: 236 THOUSANDS/UL (ref 149–390)
PLATELET BLD QL SMEAR: ADEQUATE
PMV BLD AUTO: 9.3 FL (ref 8.9–12.7)
POTASSIUM SERPL-SCNC: 3.3 MMOL/L (ref 3.5–5.3)
PROT SERPL-MCNC: 4.9 G/DL (ref 6.4–8.4)
RBC # BLD AUTO: 2.94 MILLION/UL (ref 3.88–5.62)
SODIUM SERPL-SCNC: 141 MMOL/L (ref 135–147)
WBC # BLD AUTO: 10.03 THOUSAND/UL (ref 4.31–10.16)

## 2023-06-02 PROCEDURE — NC001 PR NO CHARGE: Performed by: FAMILY MEDICINE

## 2023-06-02 PROCEDURE — 80053 COMPREHEN METABOLIC PANEL: CPT | Performed by: FAMILY MEDICINE

## 2023-06-02 PROCEDURE — C9113 INJ PANTOPRAZOLE SODIUM, VIA: HCPCS | Performed by: FAMILY MEDICINE

## 2023-06-02 PROCEDURE — 99498 ADVNCD CARE PLAN ADDL 30 MIN: CPT | Performed by: NURSE PRACTITIONER

## 2023-06-02 PROCEDURE — 99233 SBSQ HOSP IP/OBS HIGH 50: CPT | Performed by: NURSE PRACTITIONER

## 2023-06-02 PROCEDURE — 85027 COMPLETE CBC AUTOMATED: CPT | Performed by: FAMILY MEDICINE

## 2023-06-02 PROCEDURE — 99239 HOSP IP/OBS DSCHRG MGMT >30: CPT | Performed by: FAMILY MEDICINE

## 2023-06-02 PROCEDURE — 99497 ADVNCD CARE PLAN 30 MIN: CPT | Performed by: NURSE PRACTITIONER

## 2023-06-02 PROCEDURE — 85007 BL SMEAR W/DIFF WBC COUNT: CPT | Performed by: FAMILY MEDICINE

## 2023-06-02 RX ORDER — POTASSIUM CHLORIDE 14.9 MG/ML
20 INJECTION INTRAVENOUS ONCE
Status: COMPLETED | OUTPATIENT
Start: 2023-06-02 | End: 2023-06-02

## 2023-06-02 RX ADMIN — DEXTROSE AND SODIUM CHLORIDE 100 ML/HR: 5; .9 INJECTION, SOLUTION INTRAVENOUS at 03:28

## 2023-06-02 RX ADMIN — POTASSIUM CHLORIDE 20 MEQ: 14.9 INJECTION, SOLUTION INTRAVENOUS at 10:12

## 2023-06-02 RX ADMIN — PANTOPRAZOLE SODIUM 40 MG: 40 INJECTION, POWDER, FOR SOLUTION INTRAVENOUS at 10:13

## 2023-06-02 RX ADMIN — DEXTROSE AND SODIUM CHLORIDE 100 ML/HR: 5; .9 INJECTION, SOLUTION INTRAVENOUS at 14:02

## 2023-06-02 NOTE — SOCIAL WORK
Palliative LSW saw patient at the bedside today  LSW appreciates the opportunity to provider patient/family with inpatient emotional support and guidance while patient continues to receive medical attention from the medical team     Topics discussed:   Met with patient in his room, friend present  Patient requesting when he was going to have his NG tube removed  Discussion had regarding risks of removing the tube, patient verbalized understanding that it could lead to perforation of bowel and death  Patient aware that should he have NG removed, he isn't medically stable to travel to [place]  Discussion had regarding Hospice IPU, patient is open to evaluation to see if he could be eligible  He stated he would like to focus on 1 thing at a time, first thing was to check eligibility for [facility], Care Management made aware  Call to patient's daughter, provided update and education on IPU  Daughter in agreement that patient shouldn't travel to [place] and agreeable to consultation for [facility]  Daughter is going to attempt to arrange for travel back to see patient  Areas that need follow-up: Care Management and SLIM made aware  Resources given: none  Others present:  Patient's friend, Trav Cotton and Resident Dr Thea Philippe    I have spent 60 minutes with Patient and family today in which greater than 50% of this time was spent in counseling/coordination of care regarding Patient and family education, Risk factor reductions, Impressions, Counseling / Coordination of care, Documenting in the medical record, Obtaining or reviewing history   and Communicating with other healthcare professionals          LSW will continue to follow as requested by the medical team, patient, or family

## 2023-06-02 NOTE — PROGRESS NOTES
[address] [place] [address]  Progress Note  Name: Janay Dean I  MRN: [id number]  Unit/Bed#: -01 I Date of Admission: 6/1/2023   Date of Service: 6/2/2023 I Hospital Day: 1    Assessment/Plan   * Goals of care, counseling/discussion  Assessment & Plan  · Ongoing goals of care discussion with patient and daughter  · Patient inclined towards hospice  · Palliative care on board  Inpatient consult placed to hospice liaison  · Wants to meet with hospice liaison before deciding on comfort measures  GI bleed  Assessment & Plan  + From nursing facility with symptoms of hematemesis  Does report intermittent abdominal pain  Last bowel movement 3 days ago  Denies any melanotic stools/bright red blood mixed with stools  · Hemoglobin 10 9 on admission ->8  3 this am  · Patient hypotensive with blood pressure readings originally in systolic 44F and heart rate 120s improving with IV fluids  · Monitor H&H every 8 hours, transfuse if hemoglobin less than 7  · On maintenance fluids  · On IV Protonix  · N p o given SBO  · Likely in the setting of proximal colon mass  · Considering comfort measures vs hospice     SBO (small bowel obstruction) ([facility] [address] )  Assessment & Plan  · CT abdomen pelvis noting proximal colon mass causing small bowel obstruction  Also noted extensive bilateral hepatic metastasis  · Currently n p o /IV fluids/as needed morphine/Zofran  · General surgery on board  They did speak with patient regarding possible treatment of her diverting loop ileostomy however considered high risk per cardiology/surgery  Surgery recommended against any type of surgical intervention & recommending palliative care/hospice care  Elevated troponin  Assessment & Plan  · Troponin peaked at 101  · Holding off on aspirin or heparin given patient having hematemesis    · Seen by cardiology, considered high risk for any intervention  · Signed off    Metastatic colon cancer to liver [facility])  Assessment & Plan  · Known history of colon cancer [attempting to get records from patient's prior oncology team]  · Patient clearly states he does not want any chemotherapy & understands high risk for any surgery  · Palliative care consulted to address goals of care  VTE Pharmacologic Prophylaxis:   Pharmacologic: held due to gi bleed  Mechanical VTE Prophylaxis in Place: Yes    Patient Centered Rounds: I have performed bedside rounds with nursing staff today  Discussions with Specialists or Other Care Team Provider: palliatice, surgery    Education and Discussions with Family / Patient: patient, daughter    Time Spent for Care: 30 minutes  More than 50% of total time spent on counseling and coordination of care as described above  Current Length of Stay: 1 day(s)    Current Patient Status: Inpatient   Certification Statement: The patient will continue to require additional inpatient hospital stay due to needs dispo plan    Discharge Plan: undetermined    Code Status: Level 3 - DNAR and DNI      Subjective:   Patient clearly states understanding that any form of surgery/intervention would be high risk  He also does not want any further chemotherapy  Inclined towards hospice  Requesting to meet with the hospice liaison  Objective:     Vitals:   Temp (24hrs), Av 5 °F (36 9 °C), Min:97 6 °F (36 4 °C), Max:99 2 °F (37 3 °C)    Temp:  [97 6 °F (36 4 °C)-99 2 °F (37 3 °C)] 98 4 °F (36 9 °C)  HR:  [] 107  Resp:  [13-26] 18  BP: (73-99)/(34-70) 99/50  SpO2:  [92 %-97 %] 97 %  Body mass index is 24 58 kg/m²  Input and Output Summary (last 24 hours): Intake/Output Summary (Last 24 hours) at 2023 1230  Last data filed at 2023 1026  Gross per 24 hour   Intake 250 ml   Output 700 ml   Net -450 ml       Physical Exam:     Physical Exam  Constitutional:       General: He is not in acute distress  Appearance: He is ill-appearing     HENT:      Mouth/Throat:      Mouth: Mucous membranes are moist       Pharynx: Oropharynx is clear  Cardiovascular:      Rate and Rhythm: Normal rate and regular rhythm  Pulses: Normal pulses  Pulmonary:      Effort: Pulmonary effort is normal  No respiratory distress  Breath sounds: Normal breath sounds  Abdominal:      General: Bowel sounds are normal  There is distension  Palpations: Abdomen is soft  Tenderness: There is abdominal tenderness  Musculoskeletal:      Right lower leg: No edema  Left lower leg: No edema  Neurological:      Mental Status: He is alert and oriented to person, place, and time  Additional Data:     Labs:    Results from last 7 days   Lab Units 06/02/23  0520 06/01/23  0927 06/01/23  0432   BANDS PCT % 17*   < >  --    EOS PCT % 0   < > 0   HEMATOCRIT % 26 6*   < > 34 6*   HEMOGLOBIN g/dL 8 3*   < > 10 9*   LYMPHS PCT %  --   --  8*   LYMPHO PCT % 7*   < >  --    MONOS PCT %  --   --  17*   MONO PCT % 3*   < >  --    NEUTROS PCT %  --   --  74   PLATELETS Thousands/uL 236   < > 355   WBC Thousand/uL 10 03   < > 11 35*    < > = values in this interval not displayed  Results from last 7 days   Lab Units 06/02/23  0520   ANION GAP mmol/L 6   ALBUMIN g/dL 2 4*   ALK PHOS U/L 288*   ALT U/L 7   AST U/L 17   BUN mg/dL 30*   CALCIUM mg/dL 7 8*   CHLORIDE mmol/L 112*   CO2 mmol/L 23   CREATININE mg/dL 1 45*   GLUCOSE RANDOM mg/dL 144*   POTASSIUM mmol/L 3 3*   SODIUM mmol/L 141   TOTAL BILIRUBIN mg/dL 0 46         Results from last 7 days   Lab Units 06/01/23  2006   POC GLUCOSE mg/dl 136         Results from last 7 days   Lab Units 06/01/23  0432   LACTIC ACID mmol/L 1 3           * I Have Reviewed All Lab Data Listed Above  * Additional Pertinent Lab Tests Reviewed: All Labs Within Last 24 Hours Reviewed    Recent Cultures (last 7 days):     Results from last 7 days   Lab Units 06/01/23  0431   BLOOD CULTURE  No Growth at 24 hrs  No Growth at 24 hrs         Last 24 Hours Medication List:   Current Facility-Administered Medications   Medication Dose Route Frequency Provider Last Rate   • dextrose 5 % and sodium chloride 0 9 %  100 mL/hr Intravenous Continuous Giselle Huang  mL/hr (06/02/23 0328)   • morphine injection  2 mg Intravenous Q6H PRN Giselle Huang MD     • ondansetron  4 mg Intravenous Q6H PRN Giselle Huang MD     • pantoprazole  40 mg Intravenous Q12H Dorothea Lanier MD          Today, Patient Was Seen By: [facility]      ** Please Note: Dictation voice to text software may have been used in the creation of this document   **

## 2023-06-02 NOTE — ASSESSMENT & PLAN NOTE
· Ongoing goals of care discussion with patient and daughter  · Patient inclined towards hospice  · Palliative care on board  Inpatient consult placed to hospice liaison  · Wants to meet with hospice liaison before deciding on comfort measures

## 2023-06-02 NOTE — ASSESSMENT & PLAN NOTE
· Troponin peaked at 101  · Holding off on aspirin or heparin given patient having hematemesis    · Seen by cardiology, considered high risk for any intervention  · Signed off

## 2023-06-02 NOTE — PLAN OF CARE
Problem: MOBILITY - ADULT  Goal: Maintain or return to baseline ADL function  Description: INTERVENTIONS:  -  Assess patient's ability to carry out ADLs; assess patient's baseline for ADL function and identify physical deficits which impact ability to perform ADLs (bathing, care of mouth/teeth, toileting, grooming, dressing, etc )  - Assess/evaluate cause of self-care deficits   - Assess range of motion  - Assess patient's mobility; develop plan if impaired  - Assess patient's need for assistive devices and provide as appropriate  - Encourage maximum independence but intervene and supervise when necessary  - Involve family in performance of ADLs  - Assess for home care needs following discharge   - Consider OT consult to assist with ADL evaluation and planning for discharge  - Provide patient education as appropriate  Outcome: Progressing  Goal: Maintains/Returns to pre admission functional level  Description: INTERVENTIONS:  - Perform BMAT or MOVE assessment daily    - Set and communicate daily mobility goal to care team and patient/family/caregiver     - Collaborate with rehabilitation services on mobility goals if consulted  - Out of bed for toileting  - Record patient progress and toleration of activity level   Outcome: Progressing       Problem: Prexisting or High Potential for Compromised Skin Integrity  Goal: Skin integrity is maintained or improved  Description: INTERVENTIONS:  - Identify patients at risk for skin breakdown  - Assess and monitor skin integrity  - Assess and monitor nutrition and hydration status  - Monitor labs   - Assess for incontinence   - Turn and reposition patient  - Assist with mobility/ambulation  - Relieve pressure over bony prominences  - Avoid friction and shearing  - Provide appropriate hygiene as needed including keeping skin clean and dry  - Evaluate need for skin moisturizer/barrier cream  - Collaborate with interdisciplinary team   - Patient/family teaching  - Consider wound care consult   Outcome: Progressing     Problem: MUSCULOSKELETAL - ADULT  Goal: Maintain or return mobility to safest level of function  Description: INTERVENTIONS:  - Assess patient's ability to carry out ADLs; assess patient's baseline for ADL function and identify physical deficits which impact ability to perform ADLs (bathing, care of mouth/teeth, toileting, grooming, dressing, etc )  - Assess/evaluate cause of self-care deficits   - Assess range of motion  - Assess patient's mobility  - Assess patient's need for assistive devices and provide as appropriate  - Encourage maximum independence but intervene and supervise when necessary  - Involve family in performance of ADLs  - Assess for home care needs following discharge   - Consider OT consult to assist with ADL evaluation and planning for discharge  - Provide patient education as appropriate  Outcome: Progressing  Goal: Maintain proper alignment of affected body part  Description: INTERVENTIONS:  - Support, maintain and protect limb and body alignment  - Provide patient/ family with appropriate education  Outcome: Progressing

## 2023-06-02 NOTE — PROGRESS NOTES
" Spiritual Care Progress Note    2023  Patient: Mildred Golden : 1935  Admission Date & Time: 2023 040  MRN: [id number] CSN: [id number]     visited patient during unit rounds   introduced self & role, discussed end-of-life priorities, explored spiritual needs including beliefs regarding afterlife and death, facilitated storytelling, and provided empathetic presence  Patient emphasized his priority for dignity and agency at end-of-life and voiced request to have NG tube removed  Aware of his poor prognosis, patient stated, \"I'm ready to die and I hope God will call me  \" Patient hopes to spend time with family in [place] while he is alive, and hopes to reunite with  loves ones upon his death  Patient thanked  for providing space to process emotions  Spiritual care remains available to support               Chaplaincy Interventions Utilized:   Empowerment: Encouraged assertiveness, Normalized experience of patient/family, Provided anticipatory guidance, and Provided chaplaincy education    Exploration: Explored emotional needs & resources, Explored relational needs & resources, Explored spiritual needs & resources, and Facilitated story telling    Relationship Building: Cultivated a relationship of care and support and Listened empathically      Chaplaincy Outcomes Achieved:  Catharsis, Debriefed/defused experience, Distress reduced, Expressed gratitude, Identified priorities, and Tearfully processed emotions      Spiritual Coping Strategies Utilized:   Spiritual comfort and Surrender     23 1100   Clinical Encounter Type   Visited With Patient   Routine Visit Introduction   Referral From Other (Comment)  (Rounds (palliative))         "

## 2023-06-02 NOTE — ASSESSMENT & PLAN NOTE
· CT abdomen pelvis noting proximal colon mass causing small bowel obstruction  Also noted extensive bilateral hepatic metastasis  · Currently n p o /IV fluids/as needed morphine/Zofran  · General surgery on board  They did speak with patient regarding possible treatment of her diverting loop ileostomy however considered high risk per cardiology/surgery  Surgery recommended against any type of surgical intervention & recommending palliative care/hospice care

## 2023-06-02 NOTE — ASSESSMENT & PLAN NOTE
+ From nursing facility with symptoms of hematemesis  Does report intermittent abdominal pain  Last bowel movement 3 days ago  Denies any melanotic stools/bright red blood mixed with stools  · Hemoglobin 10 9 on admission ->8  3 this am  · Patient hypotensive with blood pressure readings originally in systolic 12N and heart rate 120s improving with IV fluids  · Monitor H&H every 8 hours, transfuse if hemoglobin less than 7  · On maintenance fluids  · On IV Protonix  · N p o given SBO  · Likely in the setting of proximal colon mass    · Considering comfort measures vs hospice

## 2023-06-02 NOTE — PROGRESS NOTES
Progress Note - Palliative & Supportive Care  Bernadette Whitmore  82 y o   male  /-01   MRN: [id number]  Encounter: [id number]     Assessment  Goals of care counseling  Colon cancer with extensive liver mets  SBO    Plan:  1  Symptom management- per SLIM  PRN Use of Pain Medications: denies  24 hour Total OME for has not used    2  Goals:  Level 3 code status       3  Social support:  · Supportive listening provided  · Normalized experience of patient/family  · Provided anxiety containment  · Provided anticipatory guidance    4  Follow up  • Palliative Care will continue to follow and goals of care discussions will be ongoing  • Please reach out via [employer] if questions or concerns arise  24 Hour History  Chart reviewed before visit  Offers no complaints at time of visit  Focus of meeting with goals of care and hospice discussion    PALLIATIVE AND SUPPORTIVE CARE FAMILY CONFERENCE:     Time of Meetin:00 to 1:00     Participants: Dr Gilford Lorenzo, Dr Carol Taylor, , [place]  and [facility], [place] miguel Alejandro, Palliative and Domenico Shepard, MSN, CRNP        Patient Participation: Patient was an active participant in the meeting  is able to make his own medical decisions with the support of family  Patient Support System: Family     Meeting Location: bedside       A family meeting was necessary to allow for thorough discussion regarding patient's clinical presentation, expected disease trajectory, and comfort care versus continuing disease directed therapy in the setting of colon cancer with extensive liver mets and SBO  Comfort care and hospice have been thoroughly explained  He had questions related to medical diagnoses, prognosis, comfort care, hospice services and coverage and after all questions were answered and he agreed that comfort care and Hospice is the appropriate care for him at this time in life   They wish to pursue hospice at [address] "of choice has explained and family wishes to proceed with a referral to [facility]       Current pain location(s): denies pain  Pain Scale:   Denies pain        Review of Systems   Constitutional: Positive for fatigue  Respiratory: Negative for shortness of breath  Cardiovascular: Negative for chest pain  Gastrointestinal: Negative for abdominal pain  Musculoskeletal: Negative  Medications    Current Facility-Administered Medications:   •  dextrose 5 % and sodium chloride 0 9 % infusion, 100 mL/hr, Intravenous, Continuous, Diana Whitaker MD, Last Rate: 100 mL/hr at 06/02/23 0328, 100 mL/hr at 06/02/23 0328  •  morphine injection 2 mg, 2 mg, Intravenous, Q6H PRN, Diana Whitaker MD  •  ondansetron (ZOFRAN) injection 4 mg, 4 mg, Intravenous, Q6H PRN, Diana Whitaker MD  •  pantoprazole (PROTONIX) injection 40 mg, 40 mg, Intravenous, Q12H [address], Diana Whitaker MD, 40 mg at 06/02/23 1013  •  potassium chloride 20 mEq IVPB (premix), 20 mEq, Intravenous, Once, Diana Whitaker MD, Last Rate: 50 mL/hr at 06/02/23 1012, 20 mEq at 06/02/23 1012    Objective  BP 99/50   Pulse (!) 107   Temp 98 4 °F (36 9 °C)   Resp 18   Ht 5' 9\" (1 753 m)   Wt 75 5 kg (166 lb 7 2 oz)   SpO2 97%   BMI 24 58 kg/m²   Physical Exam  Constitutional:       General: He is not in acute distress  Cardiovascular:      Rate and Rhythm: Tachycardia present  Pulmonary:      Effort: Pulmonary effort is normal    Skin:     General: Skin is warm and dry  Coloration: Skin is not jaundiced  Neurological:      Mental Status: He is alert and oriented to person, place, and time             Lab Results:   CBC:   Lab Results   Component Value Date    HCT 26 6 (L) 06/02/2023    HGB 8 3 (L) 06/02/2023    MCH 28 2 06/02/2023    MCHC 31 2 (L) 06/02/2023    MCV 91 06/02/2023    MPV 9 3 06/02/2023     06/02/2023    RBC 2 94 (L) 06/02/2023    RDW 20 0 (H) 06/02/2023    WBC 10 03 06/02/2023   , CMP:   Lab Results " "  Component Value Date    ALKPHOS 288 (H) 06/02/2023    ALT 7 06/02/2023    AST 17 06/02/2023    BUN 30 (H) 06/02/2023    CALCIUM 7 8 (L) 06/02/2023     (H) 06/02/2023    CO2 23 06/02/2023    CREATININE 1 45 (H) 06/02/2023    EGFR 43 06/02/2023    K 3 3 (L) 06/02/2023    SODIUM 141 06/02/2023     Imaging Studies: I have personally reviewed pertinent reports  EKG, Pathology, and Other Studies: I have personally reviewed pertinent reports  Counseling / Coordination of Care  Total floor / unit time spent today 60 minutes  Greater than 50% of total time was spent with the patient and / or family counseling and / or coordinating of care  A description of the counseling / coordination of care: Chart reviewed,   provided medical updates, determined goals of care, discussed palliative care and symptom management, discussed code status, discussed comfort care and hospice, provided anticipatory guidance, determined competency and POA/HCA, determined social/family support, provided psychosocial support  Reviewed with EUGENE and SANDRA Mendoza, MSN, CRNP  Palliative & Supportive Care    Portions of this document may have been created using dictation software and as such some \"sound alike\" terms may have been generated by the system  Do not hesitate to contact me with any questions or clarifications      "

## 2023-06-02 NOTE — ASSESSMENT & PLAN NOTE
· Known history of colon cancer [attempting to get records from patient's prior oncology team]  · Patient clearly states he does not want any chemotherapy & understands high risk for any surgery  · Palliative care consulted to address goals of care

## 2023-06-02 NOTE — CASE MANAGEMENT
Case Management Discharge Planning Note    Patient name Jed Pandya  Location /-84 MRN [id number]  : 1935 Date 2023       Current Admission Date: 2023  Current Admission Diagnosis:Goals of care, counseling/discussion   Patient Active Problem List    Diagnosis Date Noted   • Goals of care, counseling/discussion 2023   • GI bleed 2023   • SBO (small bowel obstruction) ([facility] [address] ) 2023   • Metastatic colon cancer to liver ([facility] [address] ) 2023   • Elevated troponin 2023      LOS (days): 1  Geometric Mean LOS (GMLOS) (days): 3 50  Days to GMLOS:2 1     OBJECTIVE:  Risk of Unplanned Readmission Score: 17 61         Current admission status: Inpatient   Preferred Pharmacy:   PATIENT/FAMILY REPORTS NO PREFERRED PHARMACY  No address on file      Primary Care Provider: Arianne Rice MD    Primary Insurance: MEDICARE  Secondary Insurance: BLUE CROSS    DISCHARGE DETAILS:      [facility] claimed the ride for Jed Pandya in unit/room  bed -01, and will arrive on 2023 at 7:30pm EDT  Contact them at [phone]   Ride details here: [email or web address]

## 2023-06-03 NOTE — ASSESSMENT & PLAN NOTE
+ From nursing facility with symptoms of hematemesis  Does report intermittent abdominal pain  Last bowel movement 3 days ago  Denies any melanotic stools/bright red blood mixed with stools  · Hemoglobin 10 9 on admission ->8 3 morning of DC  · Patient hypotensive with blood pressure readings originally in systolic 69G and heart rate 120s improvED with IV fluids  · S/P maintenance fluids  · S/P IV Protonix  · N p o given SBO  · Likely in the setting of proximal colon mass    · Going to  hospice unit

## 2023-06-03 NOTE — ASSESSMENT & PLAN NOTE
· Ongoing goals of care discussion with patient and daughter  · Palliative care on board  Inpatient consult placed to hospice liaison    · Discharged to inpatient hospice unit after meeting with hospice liason

## 2023-06-03 NOTE — ASSESSMENT & PLAN NOTE
· Known history of colon cancer [attempting to get records from patient's prior oncology team]  · Patient clearly states he does not want any chemotherapy & understands high risk for any surgery  · Palliative care consulted to address goals of care    · Going to  hospice facility

## 2023-06-03 NOTE — ASSESSMENT & PLAN NOTE
· CT abdomen pelvis noting proximal colon mass causing small bowel obstruction  Also noted extensive bilateral hepatic metastasis  · S/p n p o status   /IV fluids/as needed morphine/Zofran  · General surgery on board  They did speak with patient regarding possible treatment of her diverting loop ileostomy however considered high risk per cardiology/surgery  Surgery recommended against any type of surgical intervention & recommending palliative care/hospice care

## 2023-06-03 NOTE — DISCHARGE SUMMARY
[facility]  Discharge- Katiuska Deal 1935, 82 y o  male MRN: [id number]  Unit/Bed#: -01 Encounter: [id number]  Primary Care Provider: Rubi Ingram MD   Date and time admitted to hospital: 6/1/2023  4:07 AM    * Goals of care, counseling/discussion  Assessment & Plan  · Ongoing goals of care discussion with patient and daughter  · Palliative care on board  Inpatient consult placed to hospice liaison  · Discharged to inpatient hospice unit after meeting with hospice liason      GI bleed  Assessment & Plan  + From nursing facility with symptoms of hematemesis  Does report intermittent abdominal pain  Last bowel movement 3 days ago  Denies any melanotic stools/bright red blood mixed with stools  · Hemoglobin 10 9 on admission ->8 3 morning of DC  · Patient hypotensive with blood pressure readings originally in systolic 38B and heart rate 120s improvED with IV fluids  · S/P maintenance fluids  · S/P IV Protonix  · N p o given SBO  · Likely in the setting of proximal colon mass  · Going to  hospice unit    SBO (small bowel obstruction) ([facility])  Assessment & Plan  · CT abdomen pelvis noting proximal colon mass causing small bowel obstruction  Also noted extensive bilateral hepatic metastasis  · S/p n p o status   /IV fluids/as needed morphine/Zofran  · General surgery on board  They did speak with patient regarding possible treatment of her diverting loop ileostomy however considered high risk per cardiology/surgery  Surgery recommended against any type of surgical intervention & recommending palliative care/hospice care  Elevated troponin  Assessment & Plan  · Troponin peaked at 101  · Holding off on aspirin or heparin given patient having hematemesis    · Seen by cardiology, considered high risk for any intervention  · Signed off    Metastatic colon cancer to [facility])  Assessment & Plan  · Known history of colon cancer [attempting to get records from patient's prior oncology team]  · Patient clearly states he does not want any chemotherapy & understands high risk for any surgery  · Palliative care consulted to address goals of care  · Going to IP hospice facility        Discharging Physician / Practitioner: Serenity Martins MD  PCP: Alexandra Grider MD  Admission Date:   Admission Orders (From admission, onward)     Ordered        06/01/23 0723  INPATIENT ADMISSION  Once                      Discharge Date: 6/2/23    Disposition:  IP hospice unit      Reason for Admission: sbo    Discharge Diagnoses:     Please see assessment and plan section above for further details regarding discharge diagnoses  Medical Problems        Consultations During Hospital Stay:  · General surgery  · Cardiology  · Critical care  · Palliative care    Medication Adjustments and Discharge Medications:  · Summary of Medication Adjustments made as a result of this hospitalization: see med rec  · Medication Dosing Tapers - Please refer to Discharge Medication List for details on any medication dosing tapers (if applicable to patient)  · Medications being temporarily held (include recommended restart time): see med rec  · Discharge Medication List: See after visit summary for reconciled discharge medications  Hospital Course:     Shara Morton is a 82 y o  male patient who originally presented to the hospital on 6/1/2023 with underlying history of colon cancer with liver metastases who presented  to the ED from's [facility] facility due to symptoms of hematemesis over 3 days  This was associated with intermittent abdominal pain  Her last bowel movement was 3 days prior to admission  · Ongoing goals of care discussion with patient and daughter  Palliative care on board  Inpatient consult placed to hospice liaison  Patient being discharged to inpatient hospice unit after meeting with hospice liaison  · For GI bleed, hemoglobin did drop from 10 9-8 3    Patient was hypotensive initially requiring "multiple IV fluid boluses and also received maintenance fluids  S/p IV Protonix  Likely in the setting of proximal colon mass  Going to inpatient hospice unit  · CT abdomen pelvis did note the proximal colon mass was now causing small bowel obstruction  Initially n p o  IV fluids as needed morphine Zofran and NG tube  General surgery on board  They did speak with patient regarding possible treatment of a diverting loop ileostomy however considered high risk per cardiology/surgery  Surgery recommended against any type of surgical intervention and recommending palliative care/hospice care  · As mentioned above, patient being transferred to inpatient hospice unit after discussion with him and his daughter along with hospice/palliative care on board  They verbalized understanding and are in agreement with plan  Condition at Discharge: poor     Discharge Day Visit / Exam:     Vitals: Blood Pressure: (!) 99/49 (06/02/23 1506)  Pulse: 105 (06/02/23 1506)  Temperature: 98 9 °F (37 2 °C) (06/02/23 1506)  Temp Source: Oral (06/01/23 1613)  Respirations: 18 (06/02/23 0711)  Height: 5' 9\" (175 3 cm) (06/01/23 1732)  Weight - Scale: 75 5 kg (166 lb 7 2 oz) (06/01/23 1732)  SpO2: 98 % (06/02/23 1506)  Exam:   Physical Exam  Constitutional:       General: He is not in acute distress  Appearance: He is normal weight  He is not toxic-appearing  HENT:      Mouth/Throat:      Mouth: Mucous membranes are moist       Pharynx: Oropharynx is clear  Cardiovascular:      Rate and Rhythm: Normal rate and regular rhythm  Pulses: Normal pulses  Pulmonary:      Effort: Pulmonary effort is normal  No respiratory distress  Breath sounds: Normal breath sounds  Abdominal:      General: Abdomen is flat  Bowel sounds are normal       Tenderness: There is abdominal tenderness  There is no guarding  Musculoskeletal:      Right lower leg: No edema  Left lower leg: No edema     Neurological:      Mental Status: He " is alert and oriented to person, place, and time  Discussion with Family: daughter    Goals of Care Discussions:  · Code Status at Discharge: Prior    Discharge instructions/Information to patient and family:   See after visit summary section titled Discharge Instructions for information provided to patient and family  Discharge Statement:  I spent 40 minutes discharging the patient  This time was spent on the day of discharge  I had direct contact with the patient on the day of discharge  Greater than 50% of the total time was spent examining patient, answering all patient questions, arranging and discussing plan of care with patient as well as directly providing post-discharge instructions  Additional time then spent on discharge activities      ** Please Note: This note has been constructed using a voice recognition system **

## 2023-06-04 LAB
BACTERIA BLD CULT: NORMAL
BACTERIA BLD CULT: NORMAL

## 2023-06-06 LAB
BACTERIA BLD CULT: NORMAL
BACTERIA BLD CULT: NORMAL